# Patient Record
Sex: FEMALE | Race: WHITE | Employment: OTHER | URBAN - NONMETROPOLITAN AREA
[De-identification: names, ages, dates, MRNs, and addresses within clinical notes are randomized per-mention and may not be internally consistent; named-entity substitution may affect disease eponyms.]

---

## 2017-09-19 ENCOUNTER — OFFICE VISIT (OUTPATIENT)
Dept: FAMILY MEDICINE | Facility: OTHER | Age: 57
End: 2017-09-19
Attending: FAMILY MEDICINE
Payer: COMMERCIAL

## 2017-09-19 VITALS
WEIGHT: 190 LBS | BODY MASS INDEX: 34.96 KG/M2 | TEMPERATURE: 97.8 F | SYSTOLIC BLOOD PRESSURE: 122 MMHG | DIASTOLIC BLOOD PRESSURE: 82 MMHG | HEIGHT: 62 IN | OXYGEN SATURATION: 94 % | HEART RATE: 76 BPM

## 2017-09-19 DIAGNOSIS — L98.9 SKIN LESION: Primary | ICD-10-CM

## 2017-09-19 PROCEDURE — 99213 OFFICE O/P EST LOW 20 MIN: CPT | Performed by: PHYSICIAN ASSISTANT

## 2017-09-19 ASSESSMENT — ANXIETY QUESTIONNAIRES
7. FEELING AFRAID AS IF SOMETHING AWFUL MIGHT HAPPEN: NOT AT ALL
1. FEELING NERVOUS, ANXIOUS, OR ON EDGE: NOT AT ALL
GAD7 TOTAL SCORE: 0
3. WORRYING TOO MUCH ABOUT DIFFERENT THINGS: NOT AT ALL
5. BEING SO RESTLESS THAT IT IS HARD TO SIT STILL: NOT AT ALL
6. BECOMING EASILY ANNOYED OR IRRITABLE: NOT AT ALL
2. NOT BEING ABLE TO STOP OR CONTROL WORRYING: NOT AT ALL

## 2017-09-19 ASSESSMENT — PATIENT HEALTH QUESTIONNAIRE - PHQ9
SUM OF ALL RESPONSES TO PHQ QUESTIONS 1-9: 0
5. POOR APPETITE OR OVEREATING: NOT AT ALL

## 2017-09-19 ASSESSMENT — PAIN SCALES - GENERAL: PAINLEVEL: NO PAIN (0)

## 2017-09-19 NOTE — NURSING NOTE
"Chief Complaint   Patient presents with     Lesion Removal     lesion on right arm and left foot       Initial /82 (BP Location: Right arm, Patient Position: Chair, Cuff Size: Adult Large)  Pulse 76  Temp 97.8  F (36.6  C) (Tympanic)  Ht 5' 2\" (1.575 m)  Wt 190 lb (86.2 kg)  SpO2 94%  BMI 34.75 kg/m2 Estimated body mass index is 34.75 kg/(m^2) as calculated from the following:    Height as of this encounter: 5' 2\" (1.575 m).    Weight as of this encounter: 190 lb (86.2 kg).  Medication Reconciliation: complete   Lala Mckenzie      "

## 2017-09-19 NOTE — PROGRESS NOTES
Subjective:  Sarah Espitia is a 56 year old female who presents with a 1 year history of skin lesion on right upper arm. Next several year history of lesion on left foot. No personal history of skin cancer.        PMH, PSH, FH reviewed and unchanged    No current outpatient prescriptions on file.     No current facility-administered medications for this visit.        No Known Allergies    Review of Systems:  Gen: negative for fever, chills, change in weight  Derm: See HPI       Objective:    B/P: 122/82, T: 97.8, P: 76, R: Data Unavailable    Physical Exam:  Constitutional: healthy, alert and no distress  Skin:4 mm violaceous lesion on right upper arm. 3mm black irregular skin lesion on plantar aspect of left foot  Psych: Mood is euthymic with corresponding affect      Assessment and Plan  (L98.9) Skin lesion  (primary encounter diagnosis)  Comment: Refer to Dr. Lezama  Plan: DERMATOLOGY REFERRAL                 Follow up with worsening sx or failure to improve or with any questions or concerns.     Farida Novoa PA-C

## 2017-09-19 NOTE — MR AVS SNAPSHOT
After Visit Summary   9/19/2017    Sarah Espitia    MRN: [id number]           Patient Information     Date Of Birth          1960        Visit Information        Provider Department      9/19/2017 3:45 PM Farida Novoa PA [facility]        Today's Diagnoses     Skin lesion    -  1       Follow-ups after your visit        Additional Services     DERMATOLOGY REFERRAL       Your provider has referred you to: Dr. Lezama    Please be aware that coverage of these services is subject to the terms and limitations of your health insurance plan.  Call member services at your health plan with any benefit or coverage questions.      Please bring the following with you to your appointment:    (1) Any X-Rays, CTs or MRIs which have been performed.  Contact the facility where they were done to arrange for  prior to your scheduled appointment.    (2) List of current medications  (3) This referral request   (4) Any documents/labs given to you for this referral                  Your next 10 appointments already scheduled     Jan 16, 2018  1:30 PM CST   (Arrive by 1:15 PM)   New Visit with ELEUTERIO Lezama MD   [facility] ([facility] )    [address] [place] [id number] [phone]              Who to contact     If you have questions or need follow up information about today's clinic visit or your schedule please contact [facility] directly at [phone].  Normal or non-critical lab and imaging results will be communicated to you by MyChart, letter or phone within 4 business days after the clinic has received the results. If you do not hear from us within 7 days, please contact the clinic through MyChart or phone. If you have a critical or abnormal lab result, we will notify you by phone as soon as possible.  Submit refill requests through [employer] or call your pharmacy and they will forward the refill request to us.  "Please allow 3 business days for your refill to be completed.          Additional Information About Your Visit        MyChart Information     [employer] gives you secure access to your electronic health record. If you see a primary care provider, you can also send messages to your care team and make appointments. If you have questions, please call your primary care clinic.  If you do not have a primary care provider, please call [phone] and they will assist you.        Care EveryWhere ID     This is your Care EveryWhere ID. This could be used by other organizations to access your [place] medical records  [id number]        Your Vitals Were     Pulse Temperature Height Pulse Oximetry BMI (Body Mass Index)       76 97.8  F (36.6  C) (Tympanic) 5' 2\" (1.575 m) 94% 34.75 kg/m2        Blood Pressure from Last 3 Encounters:   09/19/17 122/82   07/15/16 105/84   01/12/15 125/70    Weight from Last 3 Encounters:   09/19/17 190 lb (86.2 kg)   07/15/16 180 lb (81.6 kg)   12/22/14 190 lb (86.2 kg)              We Performed the Following     DERMATOLOGY REFERRAL        Primary Care Provider Office Phone # Fax #    Mary Chapa [phone]       [facility] [place] [address]  [place] [address]        Equal Access to Services     KIKI MAC : Hadii aad ku hadasho Soomaali, waaxda luqadaha, qaybta kaalmada adeegyada, waxay desmond haynikki omalley . So [facility] [phone].    ATENCIÓN: Si habla español, tiene a bone disposición servicios gratuitos de asistencia lingüística. Llame al [phone].    We comply with applicable federal civil rights laws and [place] laws. We do not discriminate on the basis of race, color, national origin, age, disability sex, sexual orientation or gender identity.            Thank you!     Thank you for choosing [facility]  for your care. Our goal is always to provide you with excellent care. Hearing back from our patients is one way we can " continue to improve our services. Please take a few minutes to complete the written survey that you may receive in the mail after your visit with us. Thank you!             Your Updated Medication List - Protect others around you: Learn how to safely use, store and throw away your medicines at www.disposemymeds.org.      Notice  As of 9/19/2017  5:01 PM    You have not been prescribed any medications.

## 2017-09-20 ASSESSMENT — ANXIETY QUESTIONNAIRES: GAD7 TOTAL SCORE: 0

## 2017-11-26 ENCOUNTER — HEALTH MAINTENANCE LETTER (OUTPATIENT)
Age: 57
End: 2017-11-26

## 2018-01-15 ENCOUNTER — TELEPHONE (OUTPATIENT)
Dept: FAMILY MEDICINE | Facility: OTHER | Age: 58
End: 2018-01-15

## 2018-01-15 NOTE — TELEPHONE ENCOUNTER
I spoke with pt and explained that she would need to be seen for this as it has never been discussed.  Pt was transferred to scheduling to make an appt.

## 2018-01-15 NOTE — TELEPHONE ENCOUNTER
Reason for call:  REFERRAL   1. Concern:Weight loss  2. Have you seen a provider for this concern? No  3. Who? Dr Kai Lieberman  4. When? ASAP  5. What services are you requesting? Weight loss consult  Description: Pt wants referral to see him.  Was an appointment offered for this a call? No  If Yes:  Appointment type                Date    Preferred method for responding to this message: Telephone Call  What is your phone number ? [phone]    If we cannot reach you directly, may we leave a detailed response at the number you provided? Yes  Can this message wait until your PCP/Provider returns if not available today? No, Pt wants to have this done if possible before Farida returns as she has been seeing Farida and hasn't seen Dr Chapa for about 3 years.

## 2018-01-16 ENCOUNTER — OFFICE VISIT (OUTPATIENT)
Dept: DERMATOLOGY | Facility: OTHER | Age: 58
End: 2018-01-16
Attending: DERMATOLOGY
Payer: COMMERCIAL

## 2018-01-16 VITALS
HEIGHT: 62 IN | TEMPERATURE: 96.8 F | WEIGHT: 190 LBS | SYSTOLIC BLOOD PRESSURE: 140 MMHG | BODY MASS INDEX: 34.96 KG/M2 | HEART RATE: 60 BPM | DIASTOLIC BLOOD PRESSURE: 70 MMHG

## 2018-01-16 DIAGNOSIS — L98.9 SKIN LESION: ICD-10-CM

## 2018-01-16 DIAGNOSIS — C44.310 BCC (BASAL CELL CARCINOMA), FACE: Primary | ICD-10-CM

## 2018-01-16 PROCEDURE — 11100 HC BIOPSY SKIN/SUBQ/MUC MEM, SINGLE LESION: CPT | Performed by: DERMATOLOGY

## 2018-01-16 PROCEDURE — 99202 OFFICE O/P NEW SF 15 MIN: CPT | Mod: 25 | Performed by: DERMATOLOGY

## 2018-01-16 PROCEDURE — 88305 TISSUE EXAM BY PATHOLOGIST: CPT | Mod: TC | Performed by: DERMATOLOGY

## 2018-01-16 ASSESSMENT — PAIN SCALES - GENERAL: PAINLEVEL: NO PAIN (0)

## 2018-01-16 NOTE — NURSING NOTE
"Chief Complaint   Patient presents with     Consult     Skin Lesion, Farida Novoa referring.       Initial /70 (BP Location: Right arm, Patient Position: Chair, Cuff Size: Adult Regular)  Pulse 60  Temp 96.8  F (36  C) (Tympanic)  Ht 1.575 m (5' 2\")  Wt 86.2 kg (190 lb)  BMI 34.75 kg/m2 Estimated body mass index is 34.75 kg/(m^2) as calculated from the following:    Height as of this encounter: 1.575 m (5' 2\").    Weight as of this encounter: 86.2 kg (190 lb).  Medication Reconciliation: complete   RIKY SOLANO      "

## 2018-01-16 NOTE — MR AVS SNAPSHOT
After Visit Summary   1/16/2018    Sarah Espitia    MRN: [id number]           Patient Information     Date Of Birth          1960        Visit Information        Provider Department      1/16/2018 1:30 PM ELEUTERIO Lezama MD [facility]        Today's Diagnoses     BCC (basal cell carcinoma), face    -  1    Skin lesion           Follow-ups after your visit        Your next 10 appointments already scheduled     Jan 29, 2018  3:30 PM CST   (Arrive by 3:15 PM)   SHORT with MILDRED Brown   [facility] ([facility] )    [address]  [facility] [address]   [phone]              Who to contact     If you have questions or need follow up information about today's clinic visit or your schedule please contact [facility] directly at [phone].  Normal or non-critical lab and imaging results will be communicated to you by MyChart, letter or phone within 4 business days after the clinic has received the results. If you do not hear from us within 7 days, please contact the clinic through MyChart or phone. If you have a critical or abnormal lab result, we will notify you by phone as soon as possible.  Submit refill requests through [employer] or call your pharmacy and they will forward the refill request to us. Please allow 3 business days for your refill to be completed.          Additional Information About Your Visit        MyChart Information     [employer] gives you secure access to your electronic health record. If you see a primary care provider, you can also send messages to your care team and make appointments. If you have questions, please call your primary care clinic.  If you do not have a primary care provider, please call [phone] and they will assist you.        Care EveryWhere ID     This is your Care EveryWhere ID. This could be used by other organizations to access your [place] medical records  [id number]       "  Your Vitals Were     Pulse Temperature Height BMI (Body Mass Index)          60 96.8  F (36  C) (Tympanic) 1.575 m (5' 2\") 34.75 kg/m2         Blood Pressure from Last 3 Encounters:   01/16/18 140/70   09/19/17 122/82   07/15/16 105/84    Weight from Last 3 Encounters:   01/16/18 86.2 kg (190 lb)   09/19/17 86.2 kg (190 lb)   07/15/16 81.6 kg (180 lb)              We Performed the Following     BIOPSY SKIN/SUBQ/MUC MEM, SINGLE LESION     Surgical pathology exam        Primary Care Provider Office Phone # Fax #    Mary KIKA Chapa [phone]       [facility] [place] [address]  [place] [address]        Equal Access to Services     [facility] BUBBA : Hadii leslie chiu hadasho Soomaali, waaxda luqadaha, qaybta kaalmada adeegyada, alfonso omalley . So [facility] [phone].    ATENCIÓN: Si habla español, tiene a bone disposición servicios gratuitos de asistencia lingüística. Llame al [phone].    We comply with applicable federal civil rights laws and [place] laws. We do not discriminate on the basis of race, color, national origin, age, disability, sex, sexual orientation, or gender identity.            Thank you!     Thank you for choosing [facility]  for your care. Our goal is always to provide you with excellent care. Hearing back from our patients is one way we can continue to improve our services. Please take a few minutes to complete the written survey that you may receive in the mail after your visit with us. Thank you!             Your Updated Medication List - Protect others around you: Learn how to safely use, store and throw away your medicines at www.disposemymeds.org.      Notice  As of 1/16/2018 11:59 PM    You have not been prescribed any medications.      "

## 2018-01-16 NOTE — LETTER
1/16/2018       RE: Sarah Espitia  [address] [place] [address]     Dear Colleague,    Thank you for referring your patient, Sarah Espitia, to the [facility] [place] [facility]. Please see a copy of my visit note below.    dicated     Again, thank you for allowing me to participate in the care of your patient.      Sincerely,    ELEUTERIO Lezama MD

## 2018-01-19 ENCOUNTER — HOSPITAL PATHOLOGY (OUTPATIENT)
Dept: OTHER | Facility: CLINIC | Age: 58
End: 2018-01-19

## 2018-01-19 LAB — COPATH REPORT: NORMAL

## 2018-01-22 ENCOUNTER — TELEPHONE (OUTPATIENT)
Dept: DERMATOLOGY | Facility: OTHER | Age: 58
End: 2018-01-22

## 2018-01-22 LAB — COPATH REPORT: NORMAL

## 2018-01-29 ENCOUNTER — OFFICE VISIT (OUTPATIENT)
Dept: FAMILY MEDICINE | Facility: OTHER | Age: 58
End: 2018-01-29
Attending: PHYSICIAN ASSISTANT
Payer: COMMERCIAL

## 2018-01-29 VITALS
WEIGHT: 190 LBS | TEMPERATURE: 97.4 F | HEART RATE: 78 BPM | RESPIRATION RATE: 18 BRPM | OXYGEN SATURATION: 98 % | DIASTOLIC BLOOD PRESSURE: 64 MMHG | SYSTOLIC BLOOD PRESSURE: 100 MMHG | BODY MASS INDEX: 34.96 KG/M2 | HEIGHT: 62 IN

## 2018-01-29 DIAGNOSIS — E66.09 CLASS 1 OBESITY DUE TO EXCESS CALORIES WITH BODY MASS INDEX (BMI) OF 34.0 TO 34.9 IN ADULT, UNSPECIFIED WHETHER SERIOUS COMORBIDITY PRESENT: Primary | ICD-10-CM

## 2018-01-29 DIAGNOSIS — E66.811 CLASS 1 OBESITY DUE TO EXCESS CALORIES WITH BODY MASS INDEX (BMI) OF 34.0 TO 34.9 IN ADULT, UNSPECIFIED WHETHER SERIOUS COMORBIDITY PRESENT: Primary | ICD-10-CM

## 2018-01-29 PROCEDURE — 99213 OFFICE O/P EST LOW 20 MIN: CPT | Performed by: PHYSICIAN ASSISTANT

## 2018-01-29 ASSESSMENT — PATIENT HEALTH QUESTIONNAIRE - PHQ9
SUM OF ALL RESPONSES TO PHQ QUESTIONS 1-9: 2
SUM OF ALL RESPONSES TO PHQ QUESTIONS 1-9: 2
10. IF YOU CHECKED OFF ANY PROBLEMS, HOW DIFFICULT HAVE THESE PROBLEMS MADE IT FOR YOU TO DO YOUR WORK, TAKE CARE OF THINGS AT HOME, OR GET ALONG WITH OTHER PEOPLE: NOT DIFFICULT AT ALL

## 2018-01-29 ASSESSMENT — PAIN SCALES - GENERAL: PAINLEVEL: NO PAIN (0)

## 2018-01-29 ASSESSMENT — ANXIETY QUESTIONNAIRES
6. BECOMING EASILY ANNOYED OR IRRITABLE: NOT AT ALL
4. TROUBLE RELAXING: NOT AT ALL
GAD7 TOTAL SCORE: 0
2. NOT BEING ABLE TO STOP OR CONTROL WORRYING: NOT AT ALL
GAD7 TOTAL SCORE: 0
3. WORRYING TOO MUCH ABOUT DIFFERENT THINGS: NOT AT ALL
7. FEELING AFRAID AS IF SOMETHING AWFUL MIGHT HAPPEN: NOT AT ALL
1. FEELING NERVOUS, ANXIOUS, OR ON EDGE: NOT AT ALL
7. FEELING AFRAID AS IF SOMETHING AWFUL MIGHT HAPPEN: NOT AT ALL
5. BEING SO RESTLESS THAT IT IS HARD TO SIT STILL: NOT AT ALL
GAD7 TOTAL SCORE: 0

## 2018-01-29 NOTE — NURSING NOTE
"Chief Complaint   Patient presents with     Referral       Initial /64 (BP Location: Left arm, Patient Position: Sitting, Cuff Size: Adult Large)  Pulse 78  Temp 97.4  F (36.3  C) (Tympanic)  Resp 18  Ht 5' 2\" (1.575 m)  Wt 190 lb (86.2 kg)  SpO2 98%  BMI 34.75 kg/m2 Estimated body mass index is 34.75 kg/(m^2) as calculated from the following:    Height as of this encounter: 5' 2\" (1.575 m).    Weight as of this encounter: 190 lb (86.2 kg).  Medication Reconciliation: complete    "

## 2018-01-29 NOTE — PROGRESS NOTES
Subjective:  Sarah Espitia is a 57 year old female  who presents for help with weight loss. She has had a lifelong struggle with her weight. Otherwise no health issues. No medications.    Active diagnoses this visit:  Data Unavailable    Past Medical History:   Diagnosis Date     Actinic keratosis 2013     Dyslipidemia 2013     Eczema 2011     Hyperplastic colon polyp 2013       Past Surgical History:   Procedure Laterality Date     Bilateral tubal ligation        SECTION  1995    pregnancy      SECTION  2000    pregnancy (provider: Helena Serna)      SECTION  1999    pregnancy       SECTION  1996    pregnancy     COLONOSCOPY N/A 2015    Procedure: COLONOSCOPY;  Surgeon: Mariola Olsen MD;  Location: [facility]     colonoscopy with polypectomy  3/30/2009    outcome: hemorrhoids, repeat 5 years (provider: Akin Hedrick)     meniscus repair  2012    left     ORTHOPEDIC SURGERY  10/2016    right meniscus repair       Family History   Problem Relation Age of Onset     Family History Negative Brother      Family History Negative Father      C.A.D. Mother      CEREBROVASCULAR DISEASE Mother 63     CVA     Lipids Mother      hyperlipidemia     Hypertension Mother      DIABETES Maternal Grandmother      CANCER Sister 30     leukemia - in remission, had chemo     Family History Negative Brother      Family History Negative Brother      Family History Negative Brother      CANCER Paternal Grandfather      lung but not a smoker     Cardiovascular Maternal Grandfather 60     AMI       No current outpatient prescriptions on file.     No current facility-administered medications for this visit.        No Known Allergies    Review of Systems:  Gen: negative for fever, change in weight  Resp: negative for cough, SOB or wheeze  CV: negative for chest pain, palpitations   GI/: negative for abdominal pain, nausea, vomiting, diarrhea, constipation  Psych:  negative for changes in mood or affect    Objective:    B/P: 100/64, T: 97.4, P: 78, R: 18    Physical Exam:  Constitutional: healthy, alert and no acute distress  CV: RRR. No murmur  Pulm: Lungs clear to auscultation without wheeze, rales or rhonchi  Psych: mentation and affect appear normal      Assessment/Plan    (E66.09,  Z68.34) Class 1 obesity due to excess calories with body mass index (BMI) of 34.0 to 34.9 in adult, unspecified whether serious comorbidity present  (primary encounter diagnosis)  Comment: Refer to Dr. Kai Lieberman, DR Jackson  Plan: BARIATRIC ADULT REFERRAL                      Farida Novoa, PAC

## 2018-01-29 NOTE — MR AVS SNAPSHOT
After Visit Summary   1/29/2018    Sarah Espitia    MRN: [id number]           Patient Information     Date Of Birth          1960        Visit Information        Provider Department      1/29/2018 3:30 PM Farida Novoa PA [facility]        Today's Diagnoses     Class 1 obesity due to excess calories with body mass index (BMI) of 34.0 to 34.9 in adult, unspecified whether serious comorbidity present    -  1       Follow-ups after your visit        Additional Services     BARIATRIC ADULT REFERRAL       Your provider has referred you to: Dr. Kai Lieberman. [facility]    Please be aware that coverage of these services is subject to the terms and limitations of your health insurance plan.  Call member services at your health plan with any benefit or coverage questions.      Please bring the following to your appointment:    >>   List of current medications   >>   This referral request   >>   Any documents/labs given to you for this referral                  Who to contact     If you have questions or need follow up information about today's clinic visit or your schedule please contact [facility] directly at [phone].  Normal or non-critical lab and imaging results will be communicated to you by [employer], letter or phone within 4 business days after the clinic has received the results. If you do not hear from us within 7 days, please contact the clinic through [employer] or phone. If you have a critical or abnormal lab result, we will notify you by phone as soon as possible.  Submit refill requests through [employer] or call your pharmacy and they will forward the refill request to us. Please allow 3 business days for your refill to be completed.          Additional Information About Your Visit        MyChart Information     [employer] gives you secure access to your electronic health record. If you see a primary care provider, you can also send messages  "to your care team and make appointments. If you have questions, please call your primary care clinic.  If you do not have a primary care provider, please call [phone] and they will assist you.        Care EveryWhere ID     This is your Care EveryWhere ID. This could be used by other organizations to access your [place] medical records  [id number]        Your Vitals Were     Pulse Temperature Respirations Height Pulse Oximetry BMI (Body Mass Index)    78 97.4  F (36.3  C) (Tympanic) 18 5' 2\" (1.575 m) 98% 34.75 kg/m2       Blood Pressure from Last 3 Encounters:   01/29/18 100/64   01/16/18 140/70   09/19/17 122/82    Weight from Last 3 Encounters:   01/29/18 190 lb (86.2 kg)   01/16/18 190 lb (86.2 kg)   09/19/17 190 lb (86.2 kg)              We Performed the Following     BARIATRIC ADULT REFERRAL        Primary Care Provider Office Phone # Fax #    Mary Chapa [phone]       [facility] [place] [address]  [place] [address]        Equal Access to Services     [facility] AH: Hadii aad ku hadasho Soomaali, waaxda luqadaha, qaybta kaalmada adeegyada, waxay idiin hayaan adeeg kharavivek la'dcn ah. So [facility] [phone].    ATENCIÓN: Si habla español, tiene a bone disposición servicios gratuitos de asistencia lingüística. Llame al [phone].    We comply with applicable federal civil rights laws and [place] laws. We do not discriminate on the basis of race, color, national origin, age, disability, sex, sexual orientation, or gender identity.            Thank you!     Thank you for choosing [facility]  for your care. Our goal is always to provide you with excellent care. Hearing back from our patients is one way we can continue to improve our services. Please take a few minutes to complete the written survey that you may receive in the mail after your visit with us. Thank you!             Your Updated Medication List - Protect others around you: Learn how to safely " use, store and throw away your medicines at www.disposemymeds.org.      Notice  As of 1/29/2018  5:13 PM    You have not been prescribed any medications.

## 2018-01-30 ASSESSMENT — ANXIETY QUESTIONNAIRES: GAD7 TOTAL SCORE: 0

## 2018-01-30 ASSESSMENT — PATIENT HEALTH QUESTIONNAIRE - PHQ9: SUM OF ALL RESPONSES TO PHQ QUESTIONS 1-9: 2

## 2018-02-01 ENCOUNTER — TRANSFERRED RECORDS (OUTPATIENT)
Dept: HEALTH INFORMATION MANAGEMENT | Facility: HOSPITAL | Age: 58
End: 2018-02-01

## 2018-11-27 ENCOUNTER — HEALTH MAINTENANCE LETTER (OUTPATIENT)
Age: 58
End: 2018-11-27

## 2019-07-15 ENCOUNTER — OFFICE VISIT (OUTPATIENT)
Dept: FAMILY MEDICINE | Facility: OTHER | Age: 59
End: 2019-07-15
Attending: NURSE PRACTITIONER
Payer: COMMERCIAL

## 2019-07-15 ENCOUNTER — NURSE TRIAGE (OUTPATIENT)
Dept: FAMILY MEDICINE | Facility: OTHER | Age: 59
End: 2019-07-15

## 2019-07-15 VITALS
TEMPERATURE: 98.7 F | OXYGEN SATURATION: 97 % | SYSTOLIC BLOOD PRESSURE: 116 MMHG | HEART RATE: 80 BPM | WEIGHT: 167.3 LBS | BODY MASS INDEX: 31.59 KG/M2 | DIASTOLIC BLOOD PRESSURE: 72 MMHG | HEIGHT: 61 IN
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
## 2019-07-15 DIAGNOSIS — L03.119 CELLULITIS AND ABSCESS OF LEG: ICD-10-CM

## 2019-07-15 DIAGNOSIS — S80.862A INSECT BITE OF LEFT LOWER LEG, INITIAL ENCOUNTER: Primary | ICD-10-CM

## 2019-07-15 DIAGNOSIS — L02.419 CELLULITIS AND ABSCESS OF LEG: ICD-10-CM

## 2019-07-15 PROCEDURE — 99213 OFFICE O/P EST LOW 20 MIN: CPT | Performed by: NURSE PRACTITIONER

## 2019-07-15 RX ORDER — DOXYCYCLINE 100 MG/1
100 CAPSULE ORAL 2 TIMES DAILY
Qty: 42 CAPSULE | Refills: 0 | Status: SHIPPED | OUTPATIENT
Start: 2019-07-15 | End: 2019-08-05

## 2019-07-15 ASSESSMENT — PAIN SCALES - GENERAL: PAINLEVEL: NO PAIN (0)

## 2019-07-15 ASSESSMENT — MIFFLIN-ST. JEOR: SCORE: 1266.25

## 2019-07-15 NOTE — NURSING NOTE
"Chief Complaint   Patient presents with     Insect Bites       Initial /72 (BP Location: Left arm, Patient Position: Sitting, Cuff Size: Adult Regular)   Pulse 80   Temp 98.7  F (37.1  C) (Tympanic)   Ht 1.549 m (5' 1\")   Wt 75.9 kg (167 lb 4.8 oz)   SpO2 97%   BMI 31.61 kg/m   Estimated body mass index is 31.61 kg/m  as calculated from the following:    Height as of this encounter: 1.549 m (5' 1\").    Weight as of this encounter: 75.9 kg (167 lb 4.8 oz).  Medication Reconciliation: complete  "

## 2019-07-15 NOTE — PROGRESS NOTES
Sarah Espitia is a 58 year old female who presents to clinic today for the following health issues:      Concern - Insect bite    Onset: about a week or two- Bite on back of right knee.  Unsure of whether or not it was an insect or a tick    Description:     Redness and swelling, tenderness, joint pain, headache, chills, dizziness    Intensity: mild, moderate    Progression of Symptoms:  worsening    Accompanying Signs & Symptoms:  body aches, shivering, sweating, headaches, weakness, fatigue, sore throat.    Previous history of similar problem:   N/A    Precipitating factors:   Worsened by: N/A    Alleviating factors:  Improved by: N/A  Therapies Tried and outcome: Ibuprofen        Patient Active Problem List   Diagnosis     Advanced care planning/counseling discussion     Hyperlipidemia with target LDL less than 130     Hypovitaminosis D     History of colonic polyps     ACP (advance care planning)     Past Surgical History:   Procedure Laterality Date     Bilateral tubal ligation        SECTION  1995    pregnancy      SECTION  2000    pregnancy (provider: Helena Serna)      SECTION  1999    pregnancy       SECTION  1996    pregnancy     COLONOSCOPY N/A 2015    Procedure: COLONOSCOPY;  Surgeon: Mariola Olsen MD;  Location: [facility]     colonoscopy with polypectomy  3/30/2009    outcome: hemorrhoids, repeat 5 years (provider: Akin Hedrick)     meniscus repair  2012    left     ORTHOPEDIC SURGERY  10/2016    right meniscus repair       Social History     Tobacco Use     Smoking status: Former Smoker     Packs/day: 1.50     Years: 16.00     Pack years: 24.00     Types: Cigarettes     Smokeless tobacco: Never Used     Tobacco comment: quit in    Substance Use Topics     Alcohol use: Yes     Comment: 1/month     Family History   Problem Relation Age of Onset     Family History Negative Brother      Family History Negative Father      C.A.D. Mother       "Cerebrovascular Disease Mother 63        CVA     Lipids Mother         hyperlipidemia     Hypertension Mother      Diabetes Maternal Grandmother      Cancer Sister 30        leukemia - in remission, had chemo     Family History Negative Brother      Family History Negative Brother      Family History Negative Brother      Cancer Paternal Grandfather         lung but not a smoker     Cardiovascular Maternal Grandfather 60        AMI         Current Outpatient Medications   Medication Sig Dispense Refill     doxycycline hyclate (VIBRAMYCIN) 100 MG capsule Take 1 capsule (100 mg) by mouth 2 times daily for 21 days 42 capsule 0     No Known Allergies  Recent Labs   Lab Test 08/25/14  0748   *   HDL 62   TRIG 173*   ALT 24   CR 0.75   GFRESTIMATED 80   GFRESTBLACK >90   GFR Calc     POTASSIUM 3.9   TSH 2.46      BP Readings from Last 3 Encounters:   07/15/19 116/72   01/29/18 100/64   01/16/18 140/70    Wt Readings from Last 3 Encounters:   07/15/19 75.9 kg (167 lb 4.8 oz)   01/29/18 86.2 kg (190 lb)   01/16/18 86.2 kg (190 lb)              Reviewed and updated as needed this visit by Provider         Review of Systems   ROS COMP: Constitutional, HEENT, cardiovascular, pulmonary, gi and gu systems are negative, except as otherwise noted.      Objective    /72 (BP Location: Left arm, Patient Position: Sitting, Cuff Size: Adult Regular)   Pulse 80   Temp 98.7  F (37.1  C) (Tympanic)   Ht 1.549 m (5' 1\")   Wt 75.9 kg (167 lb 4.8 oz)   SpO2 97%   BMI 31.61 kg/m    Body mass index is 31.61 kg/m .       Physical Exam   GENERAL: healthy, alert and no distress  NECK: no adenopathy, no asymmetry, masses, or scars and thyroid normal to palpation  RESP: lungs clear to auscultation - no rales, rhonchi or wheezes  CV: regular rate and rhythm, normal S1 S2, no S3 or S4, no murmur, click or rub, no peripheral edema and peripheral pulses strong  SKIN: Right popliteal rash - extending medial to lateral, " erythematous, with a central area which looks like a bite. Surrounding erythema - to 4 cm.             Assessment & Plan     1. Insect bite of left lower leg, initial encounter  2. Cellulitis and abscess of leg  - doxycycline hyclate (VIBRAMYCIN) 100 MG capsule; Take 1 capsule (100 mg) by mouth 2 times daily for 21 days  Dispense: 42 capsule; Refill: 0    Monitor for fever  Keep area clean and dry  Topical antibiotic as needed  Derm marker to monitor for spread of infection  Ibuprofen as needed    I  recommend a lyme titer in 3 months    If this does not improve please contact your primary care provider, or go to     Diflucan Rx written in the event of a yeast infection        Return if symptoms worsen or fail to improve.      Zulema Snow NP  [facility]

## 2019-07-15 NOTE — PATIENT INSTRUCTIONS
Assessment & Plan     1. Insect bite of left lower leg, initial encounter  2. Cellulitis and abscess of leg  - doxycycline hyclate (VIBRAMYCIN) 100 MG capsule; Take 1 capsule (100 mg) by mouth 2 times daily for 21 days  Dispense: 42 capsule; Refill: 0    Monitor for fever  Keep area clean and dry  Topical antibiotic as needed  Derm marker to monitor for spread of infection  Ibuprofen as needed    I  recommend a lyme titer in 3 months    If this does not improve please contact your primary care provider, or go to     Diflucan Rx written in the event of a yeast infection        Return if symptoms worsen or fail to improve.      Zulema Snow NP  [facility]

## 2020-03-02 ENCOUNTER — HEALTH MAINTENANCE LETTER (OUTPATIENT)
Age: 60
End: 2020-03-02

## 2020-03-12 ENCOUNTER — ANCILLARY PROCEDURE (OUTPATIENT)
Dept: GENERAL RADIOLOGY | Facility: OTHER | Age: 60
End: 2020-03-12
Attending: FAMILY MEDICINE
Payer: COMMERCIAL

## 2020-03-12 ENCOUNTER — OFFICE VISIT (OUTPATIENT)
Dept: ORTHOPEDICS | Facility: OTHER | Age: 60
End: 2020-03-12
Attending: ORTHOPAEDIC SURGERY
Payer: COMMERCIAL

## 2020-03-12 ENCOUNTER — OFFICE VISIT (OUTPATIENT)
Dept: FAMILY MEDICINE | Facility: OTHER | Age: 60
End: 2020-03-12
Attending: FAMILY MEDICINE
Payer: COMMERCIAL

## 2020-03-12 VITALS
DIASTOLIC BLOOD PRESSURE: 82 MMHG | OXYGEN SATURATION: 97 % | SYSTOLIC BLOOD PRESSURE: 126 MMHG | HEART RATE: 64 BPM | HEIGHT: 61 IN | TEMPERATURE: 97.3 F | WEIGHT: 179 LBS | BODY MASS INDEX: 33.79 KG/M2

## 2020-03-12 VITALS
HEART RATE: 77 BPM | DIASTOLIC BLOOD PRESSURE: 80 MMHG | OXYGEN SATURATION: 98 % | SYSTOLIC BLOOD PRESSURE: 120 MMHG | TEMPERATURE: 98.6 F

## 2020-03-12 DIAGNOSIS — S52.571A OTHER CLOSED INTRA-ARTICULAR FRACTURE OF DISTAL END OF RIGHT RADIUS, INITIAL ENCOUNTER: ICD-10-CM

## 2020-03-12 DIAGNOSIS — M25.531 RIGHT WRIST PAIN: ICD-10-CM

## 2020-03-12 DIAGNOSIS — M25.531 RIGHT WRIST PAIN: Primary | ICD-10-CM

## 2020-03-12 DIAGNOSIS — M79.641 PAIN OF RIGHT HAND: ICD-10-CM

## 2020-03-12 DIAGNOSIS — S52.501A CLOSED FRACTURE OF DISTAL END OF RIGHT RADIUS, UNSPECIFIED FRACTURE MORPHOLOGY, INITIAL ENCOUNTER: Primary | ICD-10-CM

## 2020-03-12 PROCEDURE — 25600 CLTX DST RDL FX/EPHYS SEP WO: CPT | Mod: RT | Performed by: ORTHOPAEDIC SURGERY

## 2020-03-12 PROCEDURE — 73110 X-RAY EXAM OF WRIST: CPT | Mod: TC

## 2020-03-12 PROCEDURE — 99213 OFFICE O/P EST LOW 20 MIN: CPT | Performed by: FAMILY MEDICINE

## 2020-03-12 PROCEDURE — 73130 X-RAY EXAM OF HAND: CPT | Mod: TC

## 2020-03-12 ASSESSMENT — PAIN SCALES - GENERAL
PAINLEVEL: NO PAIN (0)
PAINLEVEL: MILD PAIN (3)

## 2020-03-12 ASSESSMENT — MIFFLIN-ST. JEOR: SCORE: 1314.32
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
## 2020-03-12 NOTE — PROGRESS NOTES
SUBJECTIVE:   CC: Sarah Espitia is an 59 year old woman who presents for preventive health visit.     Healthy Habits:    Do you get at least three servings of calcium containing foods daily (dairy, green leafy vegetables, etc.)? No    Amount of exercise or daily activities, outside of work: 7 day(s) per week    Problems taking medications regularly not applicable    Medication side effects: No    Have you had an eye exam in the past two years? yes    Do you see a dentist twice per year? yes    Do you have sleep apnea, excessive snoring or daytime drowsiness?no          Today's PHQ-2 Score: No flowsheet data found.    Abuse: Current or Past(Physical, Sexual or Emotional)- No  Do you feel safe in your environment? Yes        Social History     Tobacco Use     Smoking status: Former Smoker     Packs/day: 1.50     Years: 16.00     Pack years: 24.00     Types: Cigarettes     Smokeless tobacco: Never Used     Tobacco comment: quit in    Substance Use Topics     Alcohol use: Yes     Comment: 1/month     If you drink alcohol do you typically have >3 drinks per day or >7 drinks per week? No                     Reviewed orders with patient.  Reviewed health maintenance and updated orders accordingly - Yes  Lab work is in process  Labs reviewed in EPIC  BP Readings from Last 3 Encounters:   20 118/70   20 122/74   20 110/75    Wt Readings from Last 3 Encounters:   20 83 kg (183 lb)   20 82.6 kg (182 lb)   20 81.2 kg (179 lb)                  Patient Active Problem List   Diagnosis     Advanced care planning/counseling discussion     Hyperlipidemia with target LDL less than 130     Hypovitaminosis D     History of colonic polyps     ACP (advance care planning)     Other closed intra-articular fracture of distal end of right radius, initial encounter     Past Surgical History:   Procedure Laterality Date     Bilateral tubal ligation        SECTION  1995    pregnancy       SECTION  2000    pregnancy (provider: Helena Serna)      SECTION  1999    pregnancy       SECTION  1996    pregnancy     COLONOSCOPY N/A 2015    Procedure: COLONOSCOPY;  Surgeon: Mariola Olsen MD;  Location: [facility]     colonoscopy with polypectomy  3/30/2009    outcome: hemorrhoids, repeat 5 years (provider: Akin Hedrick)     meniscus repair  2012    left     ORTHOPEDIC SURGERY  10/2016    right meniscus repair       Social History     Tobacco Use     Smoking status: Former Smoker     Packs/day: 1.50     Years: 16.00     Pack years: 24.00     Types: Cigarettes     Smokeless tobacco: Never Used     Tobacco comment: quit in    Substance Use Topics     Alcohol use: Yes     Frequency: 2-4 times a month     Drinks per session: 1 or 2     Comment: 1/month     Family History   Problem Relation Age of Onset     Family History Negative Brother      Pancreatitis Father 91        blocked pancreatic duct     C.A.D. Mother      Cerebrovascular Disease Mother 63        CVA     Lipids Mother         hyperlipidemia     Hypertension Mother      Diabetes Maternal Grandmother      Kidney Disease Maternal Grandmother      Obesity Maternal Grandmother      Cancer Sister 30        leukemia - in remission, had chemo     Family History Negative Brother      Family History Negative Brother      Family History Negative Brother      Lung Cancer Paternal Grandfather         neg tobacco     Cardiovascular Maternal Grandfather 60        AMI     Dementia Paternal Grandmother          No current outpatient medications on file.     No Known Allergies    Mammogram Screening: Patient over age 50, mutual decision to screen reflected in health maintenance.    Pertinent mammograms are reviewed under the imaging tab.  History of abnormal Pap smear: NO - age 30-65 PAP every 5 years with negative HPV co-testing recommended  PAP / HPV 2014   PAP NIL     Reviewed and updated as needed this visit by  clinical staff  Tobacco  Allergies  Meds  Med Hx  Surg Hx  Fam Hx  Soc Hx        Reviewed and updated as needed this visit by Provider        Past Medical History:   Diagnosis Date     Actinic keratosis 2013     Dyslipidemia 2013     Eczema 2011     GRISELDA (generalized anxiety disorder)      Hyperplastic colon polyp 2013      Past Surgical History:   Procedure Laterality Date     Bilateral tubal ligation        SECTION  1995    pregnancy      SECTION  2000    pregnancy (provider: Helena Serna)      SECTION  1999    pregnancy       SECTION  1996    pregnancy     COLONOSCOPY N/A 2015    Procedure: COLONOSCOPY;  Surgeon: Mariola Olsen MD;  Location: [facility]     colonoscopy with polypectomy  3/30/2009    outcome: hemorrhoids, repeat 5 years (provider: Akin Hedrick)     meniscus repair  2012    left     ORTHOPEDIC SURGERY  10/2016    right meniscus repair     OB History    Para Term  AB Living   4 4 4 0 0 4   SAB TAB Ectopic Multiple Live Births   0 0 0 0 0      # Outcome Date GA Lbr Ben/2nd Weight Sex Delivery Anes PTL Lv   4 Term      -SEC      3 Term      -SEC      2 Term      -SEC      1 Term      -SEC         Obstetric Comments   Breast fed       ROS:  CONSTITUTIONAL: NEGATIVE for fever, chills, change in weight  INTEGUMENTARY/SKIN: NEGATIVE for worrisome rashes, moles or lesions  EYES: NEGATIVE for vision changes or irritation  ENT: NEGATIVE for ear, mouth and throat problems  RESP: NEGATIVE for significant cough or SOB  BREAST: NEGATIVE for masses, tenderness or discharge  CV: NEGATIVE for chest pain, palpitations or peripheral edema  GI: NEGATIVE for nausea, abdominal pain, heartburn, or change in bowel habits  : NEGATIVE for unusual urinary or vaginal symptoms. No vaginal bleeding.  MUSCULOSKELETAL: NEGATIVE for significant arthralgias or myalgia  NEURO: NEGATIVE for weakness,  "dizziness or paresthesias  PSYCHIATRIC: NEGATIVE for changes in mood or affect     OBJECTIVE:   /70 (BP Location: Right arm, Patient Position: Chair, Cuff Size: Adult Regular)   Pulse 62   Temp 97.5  F (36.4  C) (Tympanic)   Resp 20   Ht 1.537 m (5' 0.5\")   Wt 83 kg (183 lb)   SpO2 97%   BMI 35.15 kg/m    EXAM:  GENERAL: healthy, alert and no distress  EYES: Eyes grossly normal to inspection, PERRL and conjunctivae and sclerae normal  HENT: ear canals and TM's normal, nose and mouth without ulcers or lesions  NECK: no adenopathy, no asymmetry, masses, or scars and thyroid normal to palpation  RESP: lungs clear to auscultation - no rales, rhonchi or wheezes  BREAST: normal without masses, tenderness or nipple discharge and no palpable axillary masses or adenopathy  CV: regular rate and rhythm, normal S1 S2, no S3 or S4, no murmur, click or rub, no peripheral edema and peripheral pulses strong  ABDOMEN: soft, nontender, no hepatosplenomegaly, no masses and bowel sounds normal   (female): normal female external genitalia, normal urethral meatus, vaginal mucosa pink, moist, well rugated, and normal cervix/adnexa/uterus without masses or discharge, pap done  MS: no gross musculoskeletal defects noted, no edema  SKIN: no suspicious lesions or rashes  NEURO: Normal strength and tone, mentation intact and speech normal  PSYCH: mentation appears normal, affect normal/bright    Diagnostic Test Results:  Labs reviewed in Epic  Results for orders placed or performed in visit on 07/31/20   Lipid Profile (Chol, Trig, HDL, LDL calc)     Status: Abnormal   Result Value Ref Range    Cholesterol 273 (H) <200 mg/dL    Triglycerides 189 (H) <150 mg/dL    HDL Cholesterol 62 >49 mg/dL    LDL Cholesterol Calculated 173 (H) <100 mg/dL    Non HDL Cholesterol 211 (H) <130 mg/dL   Comprehensive metabolic panel     Status: None   Result Value Ref Range    Sodium 140 133 - 144 mmol/L    Potassium 3.7 3.4 - 5.3 mmol/L    Chloride " 108 94 - 109 mmol/L    Carbon Dioxide 28 20 - 32 mmol/L    Anion Gap 4 3 - 14 mmol/L    Glucose 90 70 - 99 mg/dL    Urea Nitrogen 15 7 - 30 mg/dL    Creatinine 0.71 0.52 - 1.04 mg/dL    GFR Estimate >90 >60 mL/min/[1.73_m2]    GFR Estimate If Black >90 >60 mL/min/[1.73_m2]    Calcium 8.8 8.5 - 10.1 mg/dL    Bilirubin Total 0.5 0.2 - 1.3 mg/dL    Albumin 3.7 3.4 - 5.0 g/dL    Protein Total 7.4 6.8 - 8.8 g/dL    Alkaline Phosphatase 78 40 - 150 U/L    ALT 23 0 - 50 U/L    AST 18 0 - 45 U/L       ASSESSMENT/PLAN:   (Z12.11) Encounter for screening colonoscopy  (primary encounter diagnosis)  Comment:   Plan: GENERAL SURG ADULT REFERRAL        Would also like 2nd opinion on right axilla lipoma    (Z00.00) Routine general medical examination at a health care facility  Comment:   Plan: A pap thin layer screen with  HPV - recommended        age 30 - 65 years (select HPV order below), HPV        High Risk Types DNA Cervical        Follow-up 1 year    (Z11.59) Encounter for screening for other viral diseases  Comment:   Plan: Hepatitis C antibody, HIV Antigen Antibody         Combo, CANCELED: Hepatitis C antibody,         CANCELED: HIV Antigen Antibody Combo          (Z12.31) Other screening mammogram  Comment:   Plan: MA SCREENING DIGITAL BILATERAL ([place])          (E78.5) Hyperlipidemia with target LDL less than 130  Comment:   Plan: Comprehensive metabolic panel, Lipid Profile         (Chol, Trig, HDL, LDL calc), CANCELED: Lipid         Profile (Chol, Trig, HDL, LDL calc)          (Z23) Need for vaccination  Comment:   Plan: TDAP, IM (10 - 64 YRS) - Adacel, ADMIN 1st         VACCINE          (R73.09) Elevated glucose  Comment:   Plan: Hemoglobin A1c, TSH with free T4 reflex          (E53.8) Vitamin B12 deficiency (non anemic)  Comment:   Plan: Vitamin B12          (E55.9) Hypovitaminosis D  Comment:   Plan: Vitamin D Deficiency            COUNSELING:   Reviewed preventive health counseling, as reflected in patient  "instructions  Special attention given to:        Regular exercise       Healthy diet/nutrition       Vision screening       Hearing screening       Colon cancer screening       Consider Hep C screening for patients born between 1945 and 1965       HIV screeninx in teen years, 1x in adult years, and at intervals if high risk       (Nancy)menopause management    Estimated body mass index is 35.15 kg/m  as calculated from the following:    Height as of this encounter: 1.537 m (5' 0.5\").    Weight as of this encounter: 83 kg (183 lb).    Weight management plan: Discussed healthy diet and exercise guidelines     reports that she has quit smoking. Her smoking use included cigarettes. She has a 24.00 pack-year smoking history. She has never used smokeless tobacco.      Counseling Resources:  ATP IV Guidelines  Pooled Cohorts Equation Calculator  Breast Cancer Risk Calculator  FRAX Risk Assessment  ICSI Preventive Guidelines  Dietary Guidelines for Americans, 2010  USDA's MyPlate  ASA Prophylaxis  Lung CA Screening    Mary Chapa MD  [facility] - [place]  "
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
## 2020-03-12 NOTE — NURSING NOTE
"Chief Complaint   Patient presents with     Musculoskeletal Problem       Initial /80   Pulse 77   Temp 98.6  F (37  C)   SpO2 98%  Estimated body mass index is 33.82 kg/m  as calculated from the following:    Height as of an earlier encounter on 3/12/20: 1.549 m (5' 1\").    Weight as of an earlier encounter on 3/12/20: 81.2 kg (179 lb).  Medication Reconciliation: complete  Jessa Behrman, LPN  "

## 2020-03-12 NOTE — NURSING NOTE
"Chief Complaint   Patient presents with     possible broken wrist       Initial /82   Pulse 64   Temp 97.3  F (36.3  C)   Ht 1.549 m (5' 1\")   Wt 81.2 kg (179 lb)   SpO2 97%   BMI 33.82 kg/m   Estimated body mass index is 33.82 kg/m  as calculated from the following:    Height as of this encounter: 1.549 m (5' 1\").    Weight as of this encounter: 81.2 kg (179 lb).  Medication Reconciliation: complete  Mariel Gill LPN    "

## 2020-03-17 DIAGNOSIS — S52.501A CLOSED FRACTURE OF DISTAL END OF RIGHT RADIUS, UNSPECIFIED FRACTURE MORPHOLOGY, INITIAL ENCOUNTER: Primary | ICD-10-CM

## 2020-03-19 ENCOUNTER — OFFICE VISIT (OUTPATIENT)
Dept: ORTHOPEDICS | Facility: OTHER | Age: 60
End: 2020-03-19
Attending: ORTHOPAEDIC SURGERY
Payer: COMMERCIAL

## 2020-03-19 ENCOUNTER — ANCILLARY PROCEDURE (OUTPATIENT)
Dept: GENERAL RADIOLOGY | Facility: OTHER | Age: 60
End: 2020-03-19
Attending: PHYSICIAN ASSISTANT
Payer: COMMERCIAL

## 2020-03-19 VITALS
WEIGHT: 179 LBS | OXYGEN SATURATION: 96 % | DIASTOLIC BLOOD PRESSURE: 70 MMHG | HEART RATE: 76 BPM | SYSTOLIC BLOOD PRESSURE: 110 MMHG | BODY MASS INDEX: 33.79 KG/M2 | TEMPERATURE: 97.8 F | HEIGHT: 61 IN

## 2020-03-19 DIAGNOSIS — S52.501A CLOSED FRACTURE OF DISTAL END OF RIGHT RADIUS, UNSPECIFIED FRACTURE MORPHOLOGY, INITIAL ENCOUNTER: Primary | ICD-10-CM

## 2020-03-19 DIAGNOSIS — S52.501A CLOSED FRACTURE OF DISTAL END OF RIGHT RADIUS, UNSPECIFIED FRACTURE MORPHOLOGY, INITIAL ENCOUNTER: ICD-10-CM

## 2020-03-19 PROCEDURE — 99207 ZZC FRACTURE CARE IN GLOBAL PERIOD: CPT | Performed by: ORTHOPAEDIC SURGERY

## 2020-03-19 PROCEDURE — 73130 X-RAY EXAM OF HAND: CPT | Mod: TC

## 2020-03-19 ASSESSMENT — MIFFLIN-ST. JEOR: SCORE: 1314.32

## 2020-03-19 ASSESSMENT — PAIN SCALES - GENERAL: PAINLEVEL: MILD PAIN (2)

## 2020-03-19 NOTE — NURSING NOTE
"Chief Complaint   Patient presents with     Musculoskeletal Problem       Initial /70   Pulse 76   Temp 97.8  F (36.6  C)   Ht 1.549 m (5' 1\")   Wt 81.2 kg (179 lb)   SpO2 96%   BMI 33.82 kg/m   Estimated body mass index is 33.82 kg/m  as calculated from the following:    Height as of this encounter: 1.549 m (5' 1\").    Weight as of this encounter: 81.2 kg (179 lb).  Medication Reconciliation: complete  Maricel Elizabeth LPN    "

## 2020-03-19 NOTE — PROGRESS NOTES
Patient presents today to follow-up after her closed radial styloid fracture.  This occurred 10 days ago.  She was placed in a thumb spica cast a week ago and is tolerating this very well.  This swelling has diminished, she is regained full range of motion of her fingers including the MCP joints the thumb courses immobilized in the thumb spica position.  She complains of no discomfort.  X-rays demonstrate no change in the alignment which remains in an acceptable position.  She will continue with her fracture care and cast care instructions and follow-up in 1 week for repeat x-ray.  If all goes well she will follow-up 2 weeks after this next visit for cast removal and x-rays out of the cast.

## 2020-03-20 DIAGNOSIS — S52.501A CLOSED FRACTURE OF DISTAL END OF RIGHT RADIUS, UNSPECIFIED FRACTURE MORPHOLOGY, INITIAL ENCOUNTER: Primary | ICD-10-CM

## 2020-03-25 ENCOUNTER — ANCILLARY PROCEDURE (OUTPATIENT)
Dept: GENERAL RADIOLOGY | Facility: OTHER | Age: 60
End: 2020-03-25
Attending: PHYSICIAN ASSISTANT
Payer: COMMERCIAL

## 2020-03-25 ENCOUNTER — OFFICE VISIT (OUTPATIENT)
Dept: ORTHOPEDICS | Facility: OTHER | Age: 60
End: 2020-03-25
Attending: ORTHOPAEDIC SURGERY
Payer: COMMERCIAL

## 2020-03-25 VITALS
SYSTOLIC BLOOD PRESSURE: 115 MMHG | BODY MASS INDEX: 33.79 KG/M2 | HEART RATE: 69 BPM | DIASTOLIC BLOOD PRESSURE: 72 MMHG | OXYGEN SATURATION: 98 % | WEIGHT: 179 LBS | TEMPERATURE: 97.6 F | HEIGHT: 61 IN

## 2020-03-25 DIAGNOSIS — S52.501A CLOSED FRACTURE OF DISTAL END OF RIGHT RADIUS, UNSPECIFIED FRACTURE MORPHOLOGY, INITIAL ENCOUNTER: Primary | ICD-10-CM

## 2020-03-25 DIAGNOSIS — S52.501A CLOSED FRACTURE OF DISTAL END OF RIGHT RADIUS, UNSPECIFIED FRACTURE MORPHOLOGY, INITIAL ENCOUNTER: ICD-10-CM

## 2020-03-25 PROCEDURE — 99207 ZZC FRACTURE CARE IN GLOBAL PERIOD: CPT | Performed by: ORTHOPAEDIC SURGERY

## 2020-03-25 PROCEDURE — 73110 X-RAY EXAM OF WRIST: CPT | Mod: TC

## 2020-03-25 ASSESSMENT — PAIN SCALES - GENERAL: PAINLEVEL: NO PAIN (0)

## 2020-03-25 ASSESSMENT — MIFFLIN-ST. JEOR: SCORE: 1314.32

## 2020-03-25 NOTE — PROGRESS NOTES
Patient presents today to follow-up for her right radial styloid fracture.  She has no complaints of pain, the cast is fitting well.  X-rays demonstrate the fracture to be well aligned with no change in position and some early callus formation is noted.    Assessment and plan: Closed, minimally displaced, stable radial styloid fracture.  The plan is to continue her in the cast for 2 more weeks, at that time bring her back for cast removal and x-ray, probably will place her in a thumb spica splint at that time and allow her to begin some early rehabilitation.
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
## 2020-03-27 DIAGNOSIS — S52.501A CLOSED FRACTURE OF DISTAL END OF RIGHT RADIUS, UNSPECIFIED FRACTURE MORPHOLOGY, INITIAL ENCOUNTER: Primary | ICD-10-CM

## 2020-04-08 ENCOUNTER — ANCILLARY PROCEDURE (OUTPATIENT)
Dept: GENERAL RADIOLOGY | Facility: OTHER | Age: 60
End: 2020-04-08
Attending: PHYSICIAN ASSISTANT
Payer: COMMERCIAL

## 2020-04-08 ENCOUNTER — OFFICE VISIT (OUTPATIENT)
Dept: ORTHOPEDICS | Facility: OTHER | Age: 60
End: 2020-04-08
Attending: ORTHOPAEDIC SURGERY
Payer: COMMERCIAL

## 2020-04-08 ENCOUNTER — HOSPITAL ENCOUNTER (OUTPATIENT)
Dept: GENERAL RADIOLOGY | Facility: HOSPITAL | Age: 60
End: 2020-04-08
Attending: ORTHOPAEDIC SURGERY
Payer: COMMERCIAL

## 2020-04-08 VITALS
BODY MASS INDEX: 33.82 KG/M2 | DIASTOLIC BLOOD PRESSURE: 75 MMHG | OXYGEN SATURATION: 97 % | TEMPERATURE: 98.6 F | WEIGHT: 179 LBS | HEART RATE: 70 BPM | SYSTOLIC BLOOD PRESSURE: 110 MMHG

## 2020-04-08 DIAGNOSIS — S52.501A CLOSED FRACTURE OF DISTAL END OF RIGHT RADIUS, UNSPECIFIED FRACTURE MORPHOLOGY, INITIAL ENCOUNTER: ICD-10-CM

## 2020-04-08 DIAGNOSIS — S52.501A CLOSED FRACTURE OF DISTAL END OF RIGHT RADIUS, UNSPECIFIED FRACTURE MORPHOLOGY, INITIAL ENCOUNTER: Primary | ICD-10-CM

## 2020-04-08 PROCEDURE — 73110 X-RAY EXAM OF WRIST: CPT | Mod: TC

## 2020-04-08 PROCEDURE — 99207 ZZC FRACTURE CARE IN GLOBAL PERIOD: CPT | Performed by: ORTHOPAEDIC SURGERY

## 2020-04-08 ASSESSMENT — PAIN SCALES - GENERAL: PAINLEVEL: MILD PAIN (3)

## 2020-04-08 NOTE — PROGRESS NOTES
Patient presents today follow-up for her right distal radius fracture, this was a fracture through the radial styloid that was minimally displaced.  At the present time she has mild tenderness over the fracture site, her skin is in good condition.  She is neurovascularly intact and the x-ray demonstrates a fracture to be healing in an acceptable position.  The plan is to place her in a removable thumb spica splint and allow her to begin some gentle range of motion.  If she has any difficulty regaining her motion or any increasing pain, she should probably follow-up for repeat evaluation.

## 2020-04-08 NOTE — NURSING NOTE
"Chief Complaint   Patient presents with     Musculoskeletal Problem       Initial /75 (BP Location: Left arm, Patient Position: Sitting, Cuff Size: Adult Regular)   Pulse 70   Temp 98.6  F (37  C) (Tympanic)   Wt 81.2 kg (179 lb)   SpO2 97%   BMI 33.82 kg/m   Estimated body mass index is 33.82 kg/m  as calculated from the following:    Height as of 3/25/20: 1.549 m (5' 1\").    Weight as of this encounter: 81.2 kg (179 lb).  Medication Reconciliation: complete      Maricel Elizabeth LPN    "

## 2020-07-07 ENCOUNTER — ANCILLARY PROCEDURE (OUTPATIENT)
Dept: GENERAL RADIOLOGY | Facility: OTHER | Age: 60
End: 2020-07-07
Attending: PHYSICIAN ASSISTANT
Payer: COMMERCIAL

## 2020-07-07 ENCOUNTER — OFFICE VISIT (OUTPATIENT)
Dept: ORTHOPEDICS | Facility: OTHER | Age: 60
End: 2020-07-07
Attending: ORTHOPAEDIC SURGERY
Payer: COMMERCIAL

## 2020-07-07 VITALS
DIASTOLIC BLOOD PRESSURE: 74 MMHG | HEIGHT: 62 IN | HEART RATE: 58 BPM | WEIGHT: 182 LBS | BODY MASS INDEX: 33.49 KG/M2 | OXYGEN SATURATION: 98 % | SYSTOLIC BLOOD PRESSURE: 122 MMHG | RESPIRATION RATE: 14 BRPM | TEMPERATURE: 96.5 F

## 2020-07-07 DIAGNOSIS — S52.501A CLOSED FRACTURE OF DISTAL END OF RIGHT RADIUS, UNSPECIFIED FRACTURE MORPHOLOGY, INITIAL ENCOUNTER: ICD-10-CM

## 2020-07-07 DIAGNOSIS — S52.501A CLOSED FRACTURE OF DISTAL END OF RIGHT RADIUS, UNSPECIFIED FRACTURE MORPHOLOGY, INITIAL ENCOUNTER: Primary | ICD-10-CM

## 2020-07-07 DIAGNOSIS — S52.501D CLOSED FRACTURE OF DISTAL END OF RIGHT RADIUS WITH ROUTINE HEALING, UNSPECIFIED FRACTURE MORPHOLOGY, SUBSEQUENT ENCOUNTER: Primary | ICD-10-CM

## 2020-07-07 PROCEDURE — 73110 X-RAY EXAM OF WRIST: CPT | Mod: TC

## 2020-07-07 PROCEDURE — 99213 OFFICE O/P EST LOW 20 MIN: CPT | Performed by: ORTHOPAEDIC SURGERY

## 2020-07-07 ASSESSMENT — PAIN SCALES - GENERAL: PAINLEVEL: NO PAIN (0)

## 2020-07-07 ASSESSMENT — MIFFLIN-ST. JEOR: SCORE: 1335.86
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
## 2020-07-07 NOTE — NURSING NOTE
"Chief Complaint   Patient presents with     Musculoskeletal Problem     right wrist follow up       Initial /74 (BP Location: Left arm, Cuff Size: Adult Regular)   Pulse 58   Temp 96.5  F (35.8  C)   Resp 14   Ht 1.562 m (5' 1.5\")   Wt 82.6 kg (182 lb)   SpO2 98%   BMI 33.83 kg/m   Estimated body mass index is 33.83 kg/m  as calculated from the following:    Height as of this encounter: 1.562 m (5' 1.5\").    Weight as of this encounter: 82.6 kg (182 lb).  Medication Reconciliation: complete  Holly Styles LPN  "

## 2020-07-31 ENCOUNTER — OFFICE VISIT (OUTPATIENT)
Dept: FAMILY MEDICINE | Facility: OTHER | Age: 60
End: 2020-07-31
Attending: FAMILY MEDICINE
Payer: COMMERCIAL

## 2020-07-31 VITALS
TEMPERATURE: 97.5 F | HEART RATE: 62 BPM | DIASTOLIC BLOOD PRESSURE: 70 MMHG | OXYGEN SATURATION: 97 % | HEIGHT: 61 IN | RESPIRATION RATE: 20 BRPM | BODY MASS INDEX: 34.55 KG/M2 | SYSTOLIC BLOOD PRESSURE: 118 MMHG | WEIGHT: 183 LBS

## 2020-07-31 DIAGNOSIS — Z12.31 OTHER SCREENING MAMMOGRAM: ICD-10-CM

## 2020-07-31 DIAGNOSIS — Z23 NEED FOR VACCINATION: ICD-10-CM

## 2020-07-31 DIAGNOSIS — R73.09 ELEVATED GLUCOSE: ICD-10-CM

## 2020-07-31 DIAGNOSIS — Z11.59 ENCOUNTER FOR SCREENING FOR OTHER VIRAL DISEASES: ICD-10-CM

## 2020-07-31 DIAGNOSIS — E78.5 HYPERLIPIDEMIA WITH TARGET LDL LESS THAN 130: ICD-10-CM

## 2020-07-31 DIAGNOSIS — Z00.00 ROUTINE GENERAL MEDICAL EXAMINATION AT A HEALTH CARE FACILITY: ICD-10-CM

## 2020-07-31 DIAGNOSIS — Z12.11 ENCOUNTER FOR SCREENING COLONOSCOPY: Primary | ICD-10-CM

## 2020-07-31 DIAGNOSIS — E55.9 HYPOVITAMINOSIS D: ICD-10-CM

## 2020-07-31 DIAGNOSIS — E53.8 VITAMIN B12 DEFICIENCY (NON ANEMIC): ICD-10-CM

## 2020-07-31 LAB
ALBUMIN SERPL-MCNC: 3.7 G/DL (ref 3.4–5)
ALP SERPL-CCNC: 78 U/L (ref 40–150)
ALT SERPL W P-5'-P-CCNC: 23 U/L (ref 0–50)
ANION GAP SERPL CALCULATED.3IONS-SCNC: 4 MMOL/L (ref 3–14)
AST SERPL W P-5'-P-CCNC: 18 U/L (ref 0–45)
BILIRUB SERPL-MCNC: 0.5 MG/DL (ref 0.2–1.3)
BUN SERPL-MCNC: 15 MG/DL (ref 7–30)
CALCIUM SERPL-MCNC: 8.8 MG/DL (ref 8.5–10.1)
CHLORIDE SERPL-SCNC: 108 MMOL/L (ref 94–109)
CHOLEST SERPL-MCNC: 273 MG/DL
CO2 SERPL-SCNC: 28 MMOL/L (ref 20–32)
CREAT SERPL-MCNC: 0.71 MG/DL (ref 0.52–1.04)
EST. AVERAGE GLUCOSE BLD GHB EST-MCNC: 120 MG/DL
GFR SERPL CREATININE-BSD FRML MDRD: >90 ML/MIN/{1.73_M2}
GLUCOSE SERPL-MCNC: 90 MG/DL (ref 70–99)
HBA1C MFR BLD: 5.8 % (ref 0–5.6)
HDLC SERPL-MCNC: 62 MG/DL
LDLC SERPL CALC-MCNC: 173 MG/DL
NONHDLC SERPL-MCNC: 211 MG/DL
POTASSIUM SERPL-SCNC: 3.7 MMOL/L (ref 3.4–5.3)
PROT SERPL-MCNC: 7.4 G/DL (ref 6.8–8.8)
SODIUM SERPL-SCNC: 140 MMOL/L (ref 133–144)
TRIGL SERPL-MCNC: 189 MG/DL
TSH SERPL DL<=0.005 MIU/L-ACNC: 1.39 MU/L (ref 0.4–4)

## 2020-07-31 PROCEDURE — 86803 HEPATITIS C AB TEST: CPT | Performed by: FAMILY MEDICINE

## 2020-07-31 PROCEDURE — 82607 VITAMIN B-12: CPT | Performed by: FAMILY MEDICINE

## 2020-07-31 PROCEDURE — 87389 HIV-1 AG W/HIV-1&-2 AB AG IA: CPT | Performed by: FAMILY MEDICINE

## 2020-07-31 PROCEDURE — 80061 LIPID PANEL: CPT | Performed by: FAMILY MEDICINE

## 2020-07-31 PROCEDURE — 36415 COLL VENOUS BLD VENIPUNCTURE: CPT | Performed by: FAMILY MEDICINE

## 2020-07-31 PROCEDURE — 90715 TDAP VACCINE 7 YRS/> IM: CPT | Performed by: FAMILY MEDICINE

## 2020-07-31 PROCEDURE — 99396 PREV VISIT EST AGE 40-64: CPT | Mod: 25 | Performed by: FAMILY MEDICINE

## 2020-07-31 PROCEDURE — 84443 ASSAY THYROID STIM HORMONE: CPT | Performed by: FAMILY MEDICINE

## 2020-07-31 PROCEDURE — 80053 COMPREHEN METABOLIC PANEL: CPT | Performed by: FAMILY MEDICINE

## 2020-07-31 PROCEDURE — 83036 HEMOGLOBIN GLYCOSYLATED A1C: CPT | Performed by: FAMILY MEDICINE

## 2020-07-31 PROCEDURE — 82306 VITAMIN D 25 HYDROXY: CPT | Performed by: FAMILY MEDICINE

## 2020-07-31 PROCEDURE — 87624 HPV HI-RISK TYP POOLED RSLT: CPT | Performed by: FAMILY MEDICINE

## 2020-07-31 PROCEDURE — 90471 IMMUNIZATION ADMIN: CPT | Performed by: FAMILY MEDICINE

## 2020-07-31 PROCEDURE — G0123 SCREEN CERV/VAG THIN LAYER: HCPCS | Performed by: FAMILY MEDICINE

## 2020-07-31 SDOH — HEALTH STABILITY: PHYSICAL HEALTH: ON AVERAGE, HOW MANY DAYS PER WEEK DO YOU ENGAGE IN MODERATE TO STRENUOUS EXERCISE (LIKE A BRISK WALK)?: 4 DAYS

## 2020-07-31 SDOH — SOCIAL STABILITY: SOCIAL INSECURITY: WITHIN THE LAST YEAR, HAVE YOU BEEN HUMILIATED OR EMOTIONALLY ABUSED IN OTHER WAYS BY YOUR PARTNER OR EX-PARTNER?: NO

## 2020-07-31 SDOH — SOCIAL STABILITY: SOCIAL INSECURITY: WITHIN THE LAST YEAR, HAVE YOU BEEN AFRAID OF YOUR PARTNER OR EX-PARTNER?: NO

## 2020-07-31 SDOH — SOCIAL STABILITY: SOCIAL INSECURITY
WITHIN THE LAST YEAR, HAVE TO BEEN RAPED OR FORCED TO HAVE ANY KIND OF SEXUAL ACTIVITY BY YOUR PARTNER OR EX-PARTNER?: NO

## 2020-07-31 SDOH — HEALTH STABILITY: PHYSICAL HEALTH: ON AVERAGE, HOW MANY MINUTES DO YOU ENGAGE IN EXERCISE AT THIS LEVEL?: 30 MIN

## 2020-07-31 SDOH — HEALTH STABILITY: MENTAL HEALTH: HOW OFTEN DO YOU HAVE A DRINK CONTAINING ALCOHOL?: 2-4 TIMES A MONTH

## 2020-07-31 SDOH — HEALTH STABILITY: MENTAL HEALTH: HOW MANY DRINKS CONTAINING ALCOHOL DO YOU HAVE ON A TYPICAL DAY WHEN YOU ARE DRINKING?: 1 OR 2

## 2020-07-31 SDOH — SOCIAL STABILITY: SOCIAL INSECURITY
WITHIN THE LAST YEAR, HAVE YOU BEEN KICKED, HIT, SLAPPED, OR OTHERWISE PHYSICALLY HURT BY YOUR PARTNER OR EX-PARTNER?: NO

## 2020-07-31 SDOH — SOCIAL STABILITY: SOCIAL INSECURITY
WITHIN THE LAST YEAR, HAVE YOU BEEN RAPED OR FORCED TO HAVE ANY KIND OF SEXUAL ACTIVITY BY YOUR PARTNER OR EX-PARTNER?: NO

## 2020-07-31 SDOH — HEALTH STABILITY: MENTAL HEALTH: HOW MANY STANDARD DRINKS CONTAINING ALCOHOL DO YOU HAVE ON A TYPICAL DAY?: 1 OR 2

## 2020-07-31 ASSESSMENT — ANXIETY QUESTIONNAIRES
6. BECOMING EASILY ANNOYED OR IRRITABLE: NOT AT ALL
5. BEING SO RESTLESS THAT IT IS HARD TO SIT STILL: NOT AT ALL
GAD7 TOTAL SCORE: 0
2. NOT BEING ABLE TO STOP OR CONTROL WORRYING: NOT AT ALL
7. FEELING AFRAID AS IF SOMETHING AWFUL MIGHT HAPPEN: NOT AT ALL
3. WORRYING TOO MUCH ABOUT DIFFERENT THINGS: NOT AT ALL
1. FEELING NERVOUS, ANXIOUS, OR ON EDGE: NOT AT ALL

## 2020-07-31 ASSESSMENT — MIFFLIN-ST. JEOR: SCORE: 1324.52

## 2020-07-31 ASSESSMENT — PATIENT HEALTH QUESTIONNAIRE - PHQ9
SUM OF ALL RESPONSES TO PHQ QUESTIONS 1-9: 0
5. POOR APPETITE OR OVEREATING: NOT AT ALL

## 2020-07-31 ASSESSMENT — PAIN SCALES - GENERAL: PAINLEVEL: NO PAIN (0)

## 2020-07-31 NOTE — NURSING NOTE
"Chief Complaint   Patient presents with     Physical       Initial /70 (BP Location: Right arm, Patient Position: Chair, Cuff Size: Adult Regular)   Pulse 62   Temp 97.5  F (36.4  C) (Tympanic)   Resp 20   Ht 1.537 m (5' 0.5\")   Wt 83 kg (183 lb)   SpO2 97%   BMI 35.15 kg/m   Estimated body mass index is 35.15 kg/m  as calculated from the following:    Height as of this encounter: 1.537 m (5' 0.5\").    Weight as of this encounter: 83 kg (183 lb).  Medication Reconciliation: complete  Tamia Alberto LPN    "

## 2020-08-01 LAB — VIT B12 SERPL-MCNC: 524 PG/ML (ref 193–986)

## 2020-08-01 ASSESSMENT — ANXIETY QUESTIONNAIRES: GAD7 TOTAL SCORE: 0

## 2020-08-03 LAB
DEPRECATED CALCIDIOL+CALCIFEROL SERPL-MC: 67 UG/L (ref 20–75)
HCV AB SERPL QL IA: NONREACTIVE
HIV 1+2 AB+HIV1 P24 AG SERPL QL IA: NONREACTIVE

## 2020-08-05 ENCOUNTER — ANCILLARY PROCEDURE (OUTPATIENT)
Dept: MAMMOGRAPHY | Facility: OTHER | Age: 60
End: 2020-08-05
Attending: FAMILY MEDICINE
Payer: COMMERCIAL

## 2020-08-05 DIAGNOSIS — Z12.31 OTHER SCREENING MAMMOGRAM: ICD-10-CM

## 2020-08-05 PROCEDURE — 77067 SCR MAMMO BI INCL CAD: CPT | Mod: TC

## 2020-08-05 PROCEDURE — 77063 BREAST TOMOSYNTHESIS BI: CPT | Mod: TC

## 2020-08-06 LAB
COPATH REPORT: NORMAL
PAP: NORMAL

## 2020-08-10 LAB
FINAL DIAGNOSIS: NORMAL
HPV HR 12 DNA CVX QL NAA+PROBE: NEGATIVE
HPV16 DNA SPEC QL NAA+PROBE: NEGATIVE
HPV18 DNA SPEC QL NAA+PROBE: NEGATIVE
SPECIMEN DESCRIPTION: NORMAL
SPECIMEN SOURCE CVX/VAG CYTO: NORMAL

## 2020-08-13 ENCOUNTER — TELEPHONE (OUTPATIENT)
Dept: SURGERY | Facility: OTHER | Age: 60
End: 2020-08-13

## 2020-08-13 NOTE — TELEPHONE ENCOUNTER
Referral received for colonoscopy.   This patient was approved by surgery education nurses for meet and greet colonoscopy and will not need a preop or consult appointment.   1st attempt to call patient to schedule. No answer. Left message for patient to return call.

## 2020-08-13 NOTE — TELEPHONE ENCOUNTER
Patient did return call and left voicemail message asking for a call back.    Attempted to return patient's call, but again got voicemail, so left another message for patient to call [phone] or [phone].

## 2020-10-26 ENCOUNTER — TELEPHONE (OUTPATIENT)
Dept: SURGERY | Facility: OTHER | Age: 60
End: 2020-10-26

## 2020-10-26 NOTE — LETTER
October 26, 2020          Sarah Espitia  [address] [place] [address]        Dear Sarah,     Our office has received a colonoscopy referral for colon cancer screening. We have made two or more unsuccessful attempts to schedule by phone. Please call when you are ready to schedule. Colonoscopy is typically recommended every 5-10 years, depending on your history, in order to prevent and detect colon cancer at its earliest stages.  Colon cancer is now the second leading cause of cancer death in the [place] for both men and women. There are over 130,000 new cases and 50,000 deaths per year from colon cancer.  Colonoscopies can prevent a large majority of these deaths. This test is not only looking for cancer, but also precancerous lesions. These lesions can be removed before they ever become cancer. You can be given some sedation which makes the test comfortable for most people. You may schedule an appointment to discuss the procedure first if you prefer. You may need a preop appointment prior to your procedure.     Colonoscopy is the best screening tool for colon cancer; however, if you do not wish to do a colonoscopy or cannot afford to do one at this time, there are other options.  One option is called a FIT test or Fecal Immunochemical Occult Blood Test  Which is a home stool sample kit that is mailed or returned to the clinic lab. This test can detect hidden bleeding in the lower colon and should be done every year. Another option is Cologuard which also involves collecting a stool sample at home. This test kit is shipped directly to your home and returned from any UPS location. It can identify altered DNA shed in the stool associated with the possibility of colon cancer or precancerous lesions and should be done every 3 years. Both tests are easy to do and do not require any dietary or medication restrictions and involve only one collection sample. If a positive result is obtained from either test,  you would be referred for a colonoscopy.    If you are under/uninsured, you may contact our Patient Financial Services Office at [phone] to determine which benefits you may qualify for. If you have completed either one of these tests or had a flexible sigmoidoscopy in the past five years at another facility, please have the records sent to our clinic so that we can best coordinate your care.  Please call us at [phone] if you have questions or would like arrange your colonoscopy procedure, consultation appointment, or discuss other options.       Sincerely;       [facility] Surgery Team

## 2020-10-26 NOTE — TELEPHONE ENCOUNTER
Referral received for colonoscopy. This is the 2nd attempt by phone to schedule meet and greet colonoscopy. No answer. Left message for patient to return call.  Letter sent requesting patient to call office to schedule colonoscopy/consult.    Referring provider notified that patient has not yet been scheduled for colonoscopy or consult after attempts have been made schedule by phone.

## 2020-12-20 ENCOUNTER — HEALTH MAINTENANCE LETTER (OUTPATIENT)
Age: 60
End: 2020-12-20

## 2021-01-18 ENCOUNTER — TELEPHONE (OUTPATIENT)
Dept: SURGERY | Facility: OTHER | Age: 61
End: 2021-01-18

## 2021-01-18 ENCOUNTER — PREP FOR PROCEDURE (OUTPATIENT)
Dept: SURGERY | Facility: OTHER | Age: 61
End: 2021-01-18

## 2021-01-18 DIAGNOSIS — Z86.0100 HISTORY OF COLONIC POLYPS: Primary | ICD-10-CM

## 2021-01-18 NOTE — TELEPHONE ENCOUNTER
Patient left voicemail inquiring about getting on the schedule for a colonoscopy. Referral received for colonoscopy. This patient was approved by surgery education nurses for meet and greet colonoscopy and will not need a preop appointment.   Patient scheduled for colonoscopy on 3/18/21 with Dr. Braun at [facility] with gatorade bowel prep.   Instructions given via phone and instructions mailed to patient with surgery handbook. Joanne Davis LPN

## 2021-03-11 ENCOUNTER — ANESTHESIA EVENT (OUTPATIENT)
Dept: SURGERY | Facility: HOSPITAL | Age: 61
End: 2021-03-11
Payer: COMMERCIAL

## 2021-03-11 NOTE — ANESTHESIA PREPROCEDURE EVALUATION
Anesthesia Pre-Procedure Evaluation    Patient: Sarah Espitia   MRN: [id number] : 1960        Preoperative Diagnosis: History of colonic polyps [Z86.010]   Procedure : Procedure(s):  Colonoscopy, possible biopsy, possible polypectomy     Past Medical History:   Diagnosis Date     Actinic keratosis 2013     Dyslipidemia 2013     Eczema 2011     GRISELDA (generalized anxiety disorder)      Hyperplastic colon polyp 2013      Past Surgical History:   Procedure Laterality Date     Bilateral tubal ligation        SECTION  1995    pregnancy      SECTION  2000    pregnancy (provider: Helena Serna)      SECTION  1999    pregnancy       SECTION  1996    pregnancy     COLONOSCOPY N/A 2015    Procedure: COLONOSCOPY;  Surgeon: Mariola Olsen MD;  Location: [facility]     colonoscopy with polypectomy  3/30/2009    outcome: hemorrhoids, repeat 5 years (provider: Akin Hedrick)     meniscus repair  2012    left     ORTHOPEDIC SURGERY  10/2016    right meniscus repair      No Known Allergies   Social History     Tobacco Use     Smoking status: Former Smoker     Packs/day: 1.50     Years: 16.00     Pack years: 24.00     Types: Cigarettes     Smokeless tobacco: Never Used     Tobacco comment: quit in    Substance Use Topics     Alcohol use: Yes     Frequency: 2-4 times a month     Drinks per session: 1 or 2     Comment: 1/month      Wt Readings from Last 1 Encounters:   20 83 kg (183 lb)        Anesthesia Evaluation   Pt has had prior anesthetic.     No history of anesthetic complications       ROS/MED HX  ENT/Pulmonary:     (+) tobacco use, Past use,     Neurologic:  - neg neurologic ROS     Cardiovascular:     (+) Dyslipidemia -----    METS/Exercise Tolerance: >4 METS    Hematologic:  - neg hematologic  ROS     Musculoskeletal:  - neg musculoskeletal ROS     GI/Hepatic:     (+) bowel prep,     Renal/Genitourinary:  - neg Renal ROS     Endo:      (+) Obesity,     Psychiatric/Substance Use:     (+) psychiatric history anxiety     Infectious Disease:  - neg infectious disease ROS     Malignancy:  - neg malignancy ROS     Other:  - neg other ROS          Physical Exam    Airway        Mallampati: II   TM distance: > 3 FB   Neck ROM: full   Mouth opening: > 3 cm    Respiratory Devices and Support         Dental  no notable dental history         Cardiovascular   cardiovascular exam normal       Rhythm and rate: regular and normal     Pulmonary   pulmonary exam normal        breath sounds clear to auscultation           OUTSIDE LABS:  CBC: No results found for: WBC, HGB, HCT, PLT  BMP:   Lab Results   Component Value Date     07/31/2020     08/25/2014    POTASSIUM 3.7 07/31/2020    POTASSIUM 3.9 08/25/2014    CHLORIDE 108 07/31/2020    CHLORIDE 102 08/25/2014    CO2 28 07/31/2020    CO2 28 08/25/2014    BUN 15 07/31/2020    BUN 16 08/25/2014    CR 0.71 07/31/2020    CR 0.75 08/25/2014    GLC 90 07/31/2020    GLC 86 08/25/2014     COAGS: No results found for: PTT, INR, FIBR  POC: No results found for: BGM, HCG, HCGS  HEPATIC:   Lab Results   Component Value Date    ALBUMIN 3.7 07/31/2020    PROTTOTAL 7.4 07/31/2020    ALT 23 07/31/2020    AST 18 07/31/2020    ALKPHOS 78 07/31/2020    BILITOTAL 0.5 07/31/2020     OTHER:   Lab Results   Component Value Date    A1C 5.8 (H) 07/31/2020    ADDIE 8.8 07/31/2020    TSH 1.39 07/31/2020    T4 8.3 08/25/2014    T3 104 08/25/2014       Anesthesia Plan    ASA Status:  2   NPO Status:  NPO Appropriate    Anesthesia Type: MAC.   Induction: Intravenous, Propofol.   Maintenance: Balanced.        Consents    Anesthesia Plan(s) and associated risks, benefits, and realistic alternatives discussed. Questions answered and patient/representative(s) expressed understanding.     - Discussed with:  Patient         Postoperative Care            Comments:                CHANDU Benson CRNA

## 2021-03-13 ENCOUNTER — OFFICE VISIT (OUTPATIENT)
Dept: FAMILY MEDICINE | Facility: OTHER | Age: 61
End: 2021-03-13
Attending: FAMILY MEDICINE
Payer: COMMERCIAL

## 2021-03-13 DIAGNOSIS — Z86.0100 HISTORY OF COLONIC POLYPS: ICD-10-CM

## 2021-03-13 LAB
SARS-COV-2 RNA RESP QL NAA+PROBE: NORMAL
SPECIMEN SOURCE: NORMAL

## 2021-03-13 PROCEDURE — U0003 INFECTIOUS AGENT DETECTION BY NUCLEIC ACID (DNA OR RNA); SEVERE ACUTE RESPIRATORY SYNDROME CORONAVIRUS 2 (SARS-COV-2) (CORONAVIRUS DISEASE [COVID-19]), AMPLIFIED PROBE TECHNIQUE, MAKING USE OF HIGH THROUGHPUT TECHNOLOGIES AS DESCRIBED BY CMS-2020-01-R: HCPCS | Performed by: FAMILY MEDICINE

## 2021-03-13 PROCEDURE — U0005 INFEC AGEN DETEC AMPLI PROBE: HCPCS | Performed by: FAMILY MEDICINE

## 2021-03-14 LAB
LABORATORY COMMENT REPORT: NORMAL
SARS-COV-2 RNA RESP QL NAA+PROBE: NEGATIVE
SPECIMEN SOURCE: NORMAL

## 2021-03-18 ENCOUNTER — HOSPITAL ENCOUNTER (OUTPATIENT)
Facility: HOSPITAL | Age: 61
Discharge: HOME | End: 2021-03-18
Attending: SURGERY | Admitting: SURGERY
Payer: COMMERCIAL

## 2021-03-18 ENCOUNTER — ANESTHESIA (OUTPATIENT)
Dept: SURGERY | Facility: HOSPITAL | Age: 61
End: 2021-03-18
Payer: COMMERCIAL

## 2021-03-18 VITALS
WEIGHT: 185 LBS | OXYGEN SATURATION: 99 % | BODY MASS INDEX: 34.04 KG/M2 | RESPIRATION RATE: 16 BRPM | HEART RATE: 56 BPM | TEMPERATURE: 98.1 F | SYSTOLIC BLOOD PRESSURE: 130 MMHG | DIASTOLIC BLOOD PRESSURE: 82 MMHG | HEIGHT: 62 IN

## 2021-03-18 DIAGNOSIS — Z86.0100 HISTORY OF COLONIC POLYPS: ICD-10-CM

## 2021-03-18 PROCEDURE — 45380 COLONOSCOPY AND BIOPSY: CPT | Mod: PT | Performed by: SURGERY

## 2021-03-18 PROCEDURE — 88305 TISSUE EXAM BY PATHOLOGIST: CPT | Mod: TC | Performed by: SURGERY

## 2021-03-18 PROCEDURE — 45385 COLONOSCOPY W/LESION REMOVAL: CPT | Performed by: NURSE ANESTHETIST, CERTIFIED REGISTERED

## 2021-03-18 PROCEDURE — 370N000017 HC ANESTHESIA TECHNICAL FEE, PER MIN: Performed by: SURGERY

## 2021-03-18 PROCEDURE — 250N000011 HC RX IP 250 OP 636: Performed by: NURSE ANESTHETIST, CERTIFIED REGISTERED

## 2021-03-18 PROCEDURE — 999N000141 HC STATISTIC PRE-PROCEDURE NURSING ASSESSMENT: Performed by: SURGERY

## 2021-03-18 PROCEDURE — 360N000075 HC SURGERY LEVEL 2, PER MIN: Performed by: SURGERY

## 2021-03-18 PROCEDURE — 258N000003 HC RX IP 258 OP 636: Performed by: NURSE ANESTHETIST, CERTIFIED REGISTERED

## 2021-03-18 PROCEDURE — 250N000009 HC RX 250: Performed by: NURSE ANESTHETIST, CERTIFIED REGISTERED

## 2021-03-18 PROCEDURE — 710N000012 HC RECOVERY PHASE 2, PER MINUTE: Performed by: SURGERY

## 2021-03-18 RX ORDER — NALOXONE HYDROCHLORIDE 0.4 MG/ML
0.2 INJECTION, SOLUTION INTRAMUSCULAR; INTRAVENOUS; SUBCUTANEOUS
Status: DISCONTINUED | OUTPATIENT
Start: 2021-03-18 | End: 2021-03-18 | Stop reason: HOSPADM

## 2021-03-18 RX ORDER — NALOXONE HYDROCHLORIDE 0.4 MG/ML
0.4 INJECTION, SOLUTION INTRAMUSCULAR; INTRAVENOUS; SUBCUTANEOUS
Status: DISCONTINUED | OUTPATIENT
Start: 2021-03-18 | End: 2021-03-18 | Stop reason: HOSPADM

## 2021-03-18 RX ORDER — ONDANSETRON 2 MG/ML
4 INJECTION INTRAMUSCULAR; INTRAVENOUS EVERY 30 MIN PRN
Status: DISCONTINUED | OUTPATIENT
Start: 2021-03-18 | End: 2021-03-18 | Stop reason: HOSPADM

## 2021-03-18 RX ORDER — LIDOCAINE 40 MG/G
CREAM TOPICAL
Status: DISCONTINUED | OUTPATIENT
Start: 2021-03-18 | End: 2021-03-18 | Stop reason: HOSPADM

## 2021-03-18 RX ORDER — SODIUM CHLORIDE, SODIUM LACTATE, POTASSIUM CHLORIDE, CALCIUM CHLORIDE 600; 310; 30; 20 MG/100ML; MG/100ML; MG/100ML; MG/100ML
INJECTION, SOLUTION INTRAVENOUS CONTINUOUS
Status: DISCONTINUED | OUTPATIENT
Start: 2021-03-18 | End: 2021-03-18 | Stop reason: HOSPADM

## 2021-03-18 RX ORDER — MEPERIDINE HYDROCHLORIDE 25 MG/ML
12.5 INJECTION INTRAMUSCULAR; INTRAVENOUS; SUBCUTANEOUS
Status: DISCONTINUED | OUTPATIENT
Start: 2021-03-18 | End: 2021-03-18 | Stop reason: HOSPADM

## 2021-03-18 RX ORDER — ONDANSETRON 4 MG/1
4 TABLET, ORALLY DISINTEGRATING ORAL EVERY 30 MIN PRN
Status: DISCONTINUED | OUTPATIENT
Start: 2021-03-18 | End: 2021-03-18 | Stop reason: HOSPADM

## 2021-03-18 RX ORDER — LIDOCAINE HYDROCHLORIDE 20 MG/ML
INJECTION, SOLUTION INFILTRATION; PERINEURAL PRN
Status: DISCONTINUED | OUTPATIENT
Start: 2021-03-18 | End: 2021-03-18

## 2021-03-18 RX ORDER — PROPOFOL 10 MG/ML
INJECTION, EMULSION INTRAVENOUS PRN
Status: DISCONTINUED | OUTPATIENT
Start: 2021-03-18 | End: 2021-03-18

## 2021-03-18 RX ORDER — FLUMAZENIL 0.1 MG/ML
0.2 INJECTION, SOLUTION INTRAVENOUS
Status: DISCONTINUED | OUTPATIENT
Start: 2021-03-18 | End: 2021-03-18 | Stop reason: HOSPADM

## 2021-03-18 RX ADMIN — PROPOFOL 20 MG: 10 INJECTION, EMULSION INTRAVENOUS at 07:59

## 2021-03-18 RX ADMIN — PROPOFOL 20 MG: 10 INJECTION, EMULSION INTRAVENOUS at 07:51

## 2021-03-18 RX ADMIN — PROPOFOL 20 MG: 10 INJECTION, EMULSION INTRAVENOUS at 07:54

## 2021-03-18 RX ADMIN — PROPOFOL 50 MG: 10 INJECTION, EMULSION INTRAVENOUS at 07:45

## 2021-03-18 RX ADMIN — LIDOCAINE HYDROCHLORIDE 40 MG: 20 INJECTION, SOLUTION INFILTRATION; PERINEURAL at 07:39

## 2021-03-18 RX ADMIN — PROPOFOL 20 MG: 10 INJECTION, EMULSION INTRAVENOUS at 07:57

## 2021-03-18 RX ADMIN — PROPOFOL 50 MG: 10 INJECTION, EMULSION INTRAVENOUS at 07:39

## 2021-03-18 RX ADMIN — PROPOFOL 50 MG: 10 INJECTION, EMULSION INTRAVENOUS at 07:43

## 2021-03-18 RX ADMIN — ONDANSETRON 4 MG: 4 TABLET, ORALLY DISINTEGRATING ORAL at 09:04

## 2021-03-18 RX ADMIN — PROPOFOL 50 MG: 10 INJECTION, EMULSION INTRAVENOUS at 07:41

## 2021-03-18 RX ADMIN — PROPOFOL 20 MG: 10 INJECTION, EMULSION INTRAVENOUS at 08:01

## 2021-03-18 RX ADMIN — PROPOFOL 20 MG: 10 INJECTION, EMULSION INTRAVENOUS at 07:48

## 2021-03-18 RX ADMIN — SODIUM CHLORIDE, POTASSIUM CHLORIDE, SODIUM LACTATE AND CALCIUM CHLORIDE: 600; 310; 30; 20 INJECTION, SOLUTION INTRAVENOUS at 07:19

## 2021-03-18 ASSESSMENT — LIFESTYLE VARIABLES: TOBACCO_USE: 1

## 2021-03-18 ASSESSMENT — MIFFLIN-ST. JEOR: SCORE: 1344.46

## 2021-03-18 NOTE — OP NOTE
Sarah Espitia MRN# [id number]   YOB: 1960 Age: 60 year old      Date of Admission:  3/18/2021  Date of Service:   3/18/21    Primary care provider: Mary Chapa    PREOPERATIVE DIAGNOSIS:  Colon Cancer Screening        POSTOPERATIVE DIAGNOSIS:  Colon polyp x 1          PROCEDURE:  Colonoscopy with polypectomy            INDICATIONS:  Screening colonoscopy.      Specimen:   ID Type Source Tests Collected by Time Destination   A : @ 40cm Polyp Colon SURGICAL PATHOLOGY EXAM Yasir Braun MD 3/18/2021  7:56 AM        SURGEON: Yasir Braun MD    DESCRIPTION OF PROCEDURE: Sarah Espitia was brought into the endoscopy suite and placed in the left lateral decubitus position. After preprocedural pause and attended monitored anesthesia was administered, the external anus was inspected and was noted to have a right lateral external hemorrhoid. Digital rectal exam was normal. The colonoscope was inserted and advanced under direct visualization to the level of the cecum which was identified by the appendiceal orifice and the ileocecal valve. The prep was excellent.. Upon slow withdrawal of the colonoscope, approximately 95% of the mucosa was directly visualized. There was one small polyp at 40cm that was removed with biopsy forceps.  The rest of the colon was without mucosal abnormality. There was no evidence of further polyps, inflammation, bleeding or AVMs. Retroflexion of the rectum was normal. The extra air was removed from the colon, and the colonoscope withdrawn. The patient tolerated the procedure well and was taken to postanesthesia care unit.     We invite the patient to return in 5 years for follow up screening evaluation, pending pathology related to history of polyps.    Yasir Braun MD

## 2021-03-18 NOTE — H&P
Surgery Consult Clinic Note      RE: Sarah Espitia  : 1960        Chief Complaint:  Colon cancer screening  Personal history of colon polyps    History of Present Illness:  I am seeing Sarah Espitia at the request of Dr. Chapa for evaluation regarding meet and greet screening colonoscopy.  She has had two previous colonoscopies,  and  with polyps removed each time.  She denies family history of colon or rectal cancer, blood in stool, changes in bowel habits, weight loss, abdominal pain.  Previous abdominal surgeries include  x5..   She has no questions regarding  bowel prep.  Reports passing clear yellow liquid stools today.     She specifically denies fevers, chills, nausea, vomiting, chest pain, shortness of breath, palpitations, sore throat, cough, or generalized feeling ill.   She had a negative COVID 19 PCR test on 3/13/2021.    Medical history:  Past Medical History:   Diagnosis Date     Actinic keratosis 2013     Dyslipidemia 2013     Eczema 2011     GRISELDA (generalized anxiety disorder)      Hyperplastic colon polyp 2013       Surgical history:  Past Surgical History:   Procedure Laterality Date     Bilateral tubal ligation        SECTION  1995    pregnancy      SECTION  2000    pregnancy (provider: Helena Serna)      SECTION  1999    pregnancy       SECTION  1996    pregnancy     COLONOSCOPY N/A 2015    Procedure: COLONOSCOPY;  Surgeon: Mariola Olsen MD;  Location: [facility]     colonoscopy with polypectomy  3/30/2009    outcome: hemorrhoids, repeat 5 years (provider: Akin Hedrick)     meniscus repair  2012    left     ORTHOPEDIC SURGERY  10/2016    right meniscus repair       Family history:  Family History   Problem Relation Age of Onset     Family History Negative Brother      Pancreatitis Father 91        blocked pancreatic duct     C.A.D. Mother      Cerebrovascular Disease Mother 63        CVA      "Lipids Mother         hyperlipidemia     Hypertension Mother      Diabetes Maternal Grandmother      Kidney Disease Maternal Grandmother      Obesity Maternal Grandmother      Cancer Sister 30        leukemia - in remission, had chemo     Family History Negative Brother      Family History Negative Brother      Family History Negative Brother      Lung Cancer Paternal Grandfather         neg tobacco     Cardiovascular Maternal Grandfather 60        AMI     Dementia Paternal Grandmother        Medications:  Prior to Admission medications    Not on File       Allergies:  The patient has No Known Allergies.  .  Social history:  Social History     Tobacco Use     Smoking status: Former Smoker     Packs/day: 1.50     Years: 16.00     Pack years: 24.00     Types: Cigarettes     Smokeless tobacco: Never Used     Tobacco comment: quit in 1993   Substance Use Topics     Alcohol use: Yes     Frequency: 2-4 times a month     Drinks per session: 1 or 2     Comment: 1/month     Marital status: .    Review of Systems:    Constitutional: Negative for fever, chills.   HENT: Negative for ear pain, congestion, sore throat, and ear discharge.    Eyes: Negative for blurred vision, double vision.   Respiratory: Negative for cough, hemoptysis, shortness of breath, wheezing and stridor.    Cardiovascular: Negative for chest pain, palpitations and orthopnea.   Gastrointestinal: Negative for heartburn, nausea, vomiting, abdominal pain and blood in stool.   Genitourinary: Negative for urgency, frequency   Musculoskeletal: Negative for myalgias, back pain and joint pain.   Neurological: Negative for tingling, speech change and headaches.   Endo/Heme/Allergies: Does not bruise/bleed easily.   Psychiatric/Behavioral: Negative for depression, suicidal ideas and hallucinations. The patient is not nervous/anxious.    Physical Examination:  /84   Pulse 81   Temp 98.1  F (36.7  C) (Tympanic)   Resp 18   Ht 1.562 m (5' 1.5\")   Wt " 83.9 kg (185 lb)   SpO2 96%   BMI 34.39 kg/m    General: Alert and orientedx4, no acute distress, well-developed/well-nourished, ambulating without assistance  HEENT: normocephalic atraumatic, extraocular movements intact, sclerae anicteric, Trachea mideline  Chest:   Clear to auscultation bilaterally.  Cardiac: S1S2 , regular rate and rhythm without additional sounds  Abdomen: Soft, non-tender, non-distended  Extremities: Cursory exam unremarkable.  No peripheral edema noted.  Skin: Warm, dry, < 2 sec cap refill  Neuro: CN 2-12 grossly intact, no focal deficit, GCS 15  Psych: Pleasant, calm, asks appropriate questions      Assessment/Plan:  #1 Colonoscopy  #2 Personal history of colon polyps    Sarah Espitia and I had a discussion about colonoscopies.  The indications, risks, benefits, althernatives and technical aspects of whole colon colonoscopy were outlined with risks including, but not limited to, perforation, bleeding and inability to visualize entire colon.  Management of each was reviewed including the risk for life saving surgery and possible admittance to the hospital.  Her questions were asked and answered.  We will proceed with colonoscopy with Dr. Braun as scheduled.  Keri Ro [facility]  [address] [place] [address]    [id number]    Referring Provider:  No referring provider defined for this encounter.     Primary Care Provider:  Mary Chapa

## 2021-03-18 NOTE — OR NURSING
Patient and responsible adult given discharge instructions with no questions regarding instructions. Antoinette score 18/18. Pain level 0/10.  Discharged from unit via w. Patient discharged to home.

## 2021-03-18 NOTE — OR NURSING
"While attempting to get dressed, pt vomited 100cc of clear fluid.  States \"better\" after emesis.  Will medicate with Zofran ODT after pt done getting dressed.  "

## 2021-03-18 NOTE — DISCHARGE INSTRUCTIONS

## 2021-03-18 NOTE — ANESTHESIA POSTPROCEDURE EVALUATION
Patient: Sarah Espitia    Procedure(s):  Colonoscopy with polypectomy    Diagnosis:History of colonic polyps [Z86.010]  Diagnosis Additional Information: No value filed.    Anesthesia Type:  MAC    Note:  Disposition: Outpatient   Postop Pain Control: Uneventful            Sign Out: Well controlled pain   PONV: No   Neuro/Psych: Uneventful            Sign Out: Acceptable/Baseline neuro status   Airway/Respiratory: Uneventful            Sign Out: Acceptable/Baseline resp. status   CV/Hemodynamics: Uneventful            Sign Out: Acceptable CV status   Other NRE: NONE   DID A NON-ROUTINE EVENT OCCUR? No         Last vitals:  Vitals:    03/18/21 0840 03/18/21 0845 03/18/21 0850   BP: 145/83 (!) 144/104 130/82   Pulse:  52 56   Resp: 16 16 16   Temp:      SpO2: 98% 99% 99%       Last vitals prior to Anesthesia Care Transfer:  CRNA VITALS  3/18/2021 0732 - 3/18/2021 0832      3/18/2021             Resp Rate (set):  8          Electronically Signed By: CHANDU Holt CRNA  March 18, 2021  9:40 AM

## 2021-03-18 NOTE — ANESTHESIA CARE TRANSFER NOTE
Patient: Sarah Espitia    Procedure(s):  Colonoscopy with polypectomy    Diagnosis: History of colonic polyps [Z86.010]  Diagnosis Additional Information: No value filed.    Anesthesia Type:   MAC     Note:      Level of Consciousness: awake  Oxygen Supplementation: nasal cannula  Level of Supplemental Oxygen (L/min / FiO2): 2  Independent Airway: airway patency satisfactory and stable  Dentition: dentition unchanged  Vital Signs Stable: post-procedure vital signs reviewed and stable  Report to RN Given: handoff report given  Patient transferred to: Phase II      post-procedure handoff checklist not completed for medical reasons    Vitals: (Last set prior to Anesthesia Care Transfer)  CRNA VITALS  3/18/2021 0732 - 3/18/2021 0815      3/18/2021             Resp Rate (set):  8        Electronically Signed By: CHANDU Benson CRNA  March 18, 2021  8:15 AM

## 2021-03-19 LAB — COPATH REPORT: NORMAL

## 2021-04-19 ENCOUNTER — TRANSFERRED RECORDS (OUTPATIENT)
Dept: HEALTH INFORMATION MANAGEMENT | Facility: CLINIC | Age: 61
End: 2021-04-19

## 2021-08-04 NOTE — PROGRESS NOTES
SUBJECTIVE:   CC: Sarah Espitia is an 60 year old woman who presents for preventive health visit.       Patient has been advised of split billing requirements and indicates understanding: Yes  Healthy Habits:     Getting at least 3 servings of Calcium per day:  Yes    Bi-annual eye exam:  Yes    Dental care twice a year:  Yes    Sleep apnea or symptoms of sleep apnea:  None    Diet:  Regular (no restrictions)    Frequency of exercise:  2-3 days/week    Duration of exercise:  15-30 minutes    Taking medications regularly:  Not Applicable    PHQ-2 Total Score: 0    Additional concerns today:  No          Hyperlipidemia Follow-Up      Are you regularly taking any medication or supplement to lower your cholesterol?   No    Are you having muscle aches or other side effects that you think could be caused by your cholesterol lowering medication?  n/a      Today's PHQ-2 Score:   PHQ-2 ( 1999 [employer]) 8/5/2021   Q1: Little interest or pleasure in doing things 0   Q2: Feeling down, depressed or hopeless 0   PHQ-2 Score 0   Q1: Little interest or pleasure in doing things Not at all   Q2: Feeling down, depressed or hopeless Not at all   PHQ-2 Score 0       Abuse: Current or Past (Physical, Sexual or Emotional) - No  Do you feel safe in your environment? Yes    Have you ever done Advance Care Planning? (For example, a Health Directive, POLST, or a discussion with a medical provider or your loved ones about your wishes): No, advance care planning information given to patient to review.  Patient declined advance care planning discussion at this time.    Social History     Tobacco Use     Smoking status: Former Smoker     Packs/day: 1.50     Years: 16.00     Pack years: 24.00     Types: Cigarettes     Smokeless tobacco: Never Used     Tobacco comment: quit in 1993   Substance Use Topics     Alcohol use: Yes     Comment: 1/month         No flowsheet data found.    Reviewed orders with patient.  Reviewed health maintenance and  updated orders accordingly - Yes  Lab work is in process  Labs reviewed in EPIC  BP Readings from Last 3 Encounters:   21 124/80   21 130/82   20 118/70    Wt Readings from Last 3 Encounters:   21 86.4 kg (190 lb 6.4 oz)   21 83.9 kg (185 lb)   20 83 kg (183 lb)                  Patient Active Problem List   Diagnosis     Advanced care planning/counseling discussion     Hyperlipidemia with target LDL less than 130     Hypovitaminosis D     History of colonic polyps     ACP (advance care planning)     Other closed intra-articular fracture of distal end of right radius, initial encounter     Elevated glucose     Vitamin B12 deficiency (non anemic)     Other eczema     Past Surgical History:   Procedure Laterality Date     Bilateral tubal ligation        SECTION  1995    pregnancy      SECTION  2000    pregnancy (provider: Helena Serna)      SECTION  1999    pregnancy       SECTION  1996    pregnancy     COLONOSCOPY N/A 2015    Procedure: COLONOSCOPY;  Surgeon: Mariola Olsen MD;  Location: [facility]     COLONOSCOPY N/A 2021    due in   Procedure: Colonoscopy with polypectomy;  Surgeon: Yasir Braun MD;  Location: [facility]     colonoscopy with polypectomy  2009    outcome: hemorrhoids, repeat 5 years (provider: Akin Hedrick)     meniscus repair  2012    left     ORTHOPEDIC SURGERY  10/2016    right meniscus repair       Social History     Tobacco Use     Smoking status: Former Smoker     Packs/day: 1.50     Years: 16.00     Pack years: 24.00     Types: Cigarettes     Smokeless tobacco: Never Used     Tobacco comment: quit in    Substance Use Topics     Alcohol use: Yes     Comment: 2-3/wk     Family History   Problem Relation Age of Onset     Pancreatitis Father 91        blocked pancreatic duct     C.A.D. Mother         s/p stenting -- negative tobacco     Cerebrovascular Disease Mother 63        CVA      Lipids Mother         hyperlipidemia     Hypertension Mother      Diabetes Type 2  Mother      Cancer Sister 30        leukemia - in remission, had chemo     Family History Negative Brother      Family History Negative Brother      Family History Negative Brother      Family History Negative Brother      Diabetes Maternal Grandmother      Kidney Disease Maternal Grandmother      Obesity Maternal Grandmother      Cardiovascular Maternal Grandfather 60        AMI     Dementia Paternal Grandmother      Lung Cancer Paternal Grandfather         neg tobacco         Current Outpatient Medications   Medication Sig Dispense Refill     triamcinolone (KENALOG) 0.1 % external cream Apply topically 2 times daily as needed for irritation 30 g 0     No Known Allergies    Breast Cancer Screening:  Any new diagnosis of family breast, ovarian, or bowel cancer? No    FHS-7: No flowsheet data found.    Mammogram Screening: Recommended mammography every 1-2 years with patient discussion and risk factor consideration  Pertinent mammograms are reviewed under the imaging tab.    History of abnormal Pap smear: NO - age 30-65 PAP every 5 years with negative HPV co-testing recommended  PAP / HPV Latest Ref Rng & Units 2020   PAP (Historical) - NIL NIL   HPV16 NEG:Negative Negative -   HPV18 NEG:Negative Negative -   HRHPV NEG:Negative Negative -     Reviewed and updated as needed this visit by clinical staff  Tobacco  Allergies  Meds   Med Hx  Surg Hx  Fam Hx  Soc Hx        Reviewed and updated as needed this visit by Provider                Past Medical History:   Diagnosis Date     Actinic keratosis 2013     Dyslipidemia 2013     Eczema 2011     GRISELDA (generalized anxiety disorder)      Hyperplastic colon polyp 2013     Tubular adenoma of colon 2021    repeat       Past Surgical History:   Procedure Laterality Date     Bilateral tubal ligation        SECTION  1995    pregnancy  "     SECTION  2000    pregnancy (provider: Helena Serna)      SECTION  1999    pregnancy       SECTION  1996    pregnancy     COLONOSCOPY N/A 2015    Procedure: COLONOSCOPY;  Surgeon: Mariola Olsen MD;  Location: [facility]     COLONOSCOPY N/A 2021    due in   Procedure: Colonoscopy with polypectomy;  Surgeon: Yasir Braun MD;  Location: [facility]     colonoscopy with polypectomy  2009    outcome: hemorrhoids, repeat 5 years (provider: Akin Hedrick)     meniscus repair  2012    left     ORTHOPEDIC SURGERY  10/2016    right meniscus repair     OB History    Para Term  AB Living   4 4 4 0 0 4   SAB TAB Ectopic Multiple Live Births   0 0 0 0 0      # Outcome Date GA Lbr Ben/2nd Weight Sex Delivery Anes PTL Lv   4 Term      -SEC      3 Term      -SEC      2 Term      -SEC      1 Term      -SEC         Obstetric Comments   Breast fed       Review of Systems  CONSTITUTIONAL: NEGATIVE for fever, chills, change in weight  INTEGUMENTARY/SKIN: NEGATIVE for worrisome rashes, moles or lesions  EYES: NEGATIVE for vision changes or irritation  ENT: NEGATIVE for ear, mouth and throat problems  RESP: NEGATIVE for significant cough or SOB  BREAST: NEGATIVE for masses, tenderness or discharge  CV: NEGATIVE for chest pain, palpitations or peripheral edema  GI: NEGATIVE for nausea, abdominal pain, heartburn, or change in bowel habits  : NEGATIVE for unusual urinary or vaginal symptoms. No vaginal bleeding.  MUSCULOSKELETAL: NEGATIVE for significant arthralgias or myalgia  NEURO: NEGATIVE for weakness, dizziness or paresthesias  PSYCHIATRIC: NEGATIVE for changes in mood or affect      OBJECTIVE:   /80   Pulse 62   Temp 97.6  F (36.4  C)   Resp 18   Ht 1.53 m (5' 0.24\")   Wt 86.4 kg (190 lb 6.4 oz)   SpO2 98%   BMI 36.89 kg/m    Physical Exam  GENERAL APPEARANCE: healthy, alert and no distress  EYES: Eyes grossly " normal to inspection, PERRL and conjunctivae and sclerae normal  HENT: ear canals and TM's normal, nose and mouth without ulcers or lesions, oropharynx clear and oral mucous membranes moist  NECK: no adenopathy, no asymmetry, masses, or scars and thyroid normal to palpation  RESP: lungs clear to auscultation - no rales, rhonchi or wheezes  BREAST: normal without masses, tenderness or nipple discharge and no palpable axillary masses or adenopathy  CV: regular rate and rhythm, normal S1 S2, no S3 or S4, no murmur, click or rub, no peripheral edema and peripheral pulses strong  ABDOMEN: soft, nontender, no hepatosplenomegaly, no masses and bowel sounds normal   (female): normal female external genitalia, normal urethral meatus, vaginal mucosal atrophy noted, normal cervix, adnexae, and uterus without masses or abnormal discharge  MS: no musculoskeletal defects are noted and gait is age appropriate without ataxia  SKIN: no suspicious lesions or rashes  NEURO: Normal strength and tone, sensory exam grossly normal, mentation intact and speech normal  PSYCH: mentation appears normal and affect normal/bright    Diagnostic Test Results:  Labs reviewed in Epic  Results for orders placed or performed in visit on 08/09/21   Comprehensive metabolic panel     Status: None (Preliminary result)   Result Value Ref Range    Sodium 141 133 - 144 mmol/L    Potassium 4.2 3.4 - 5.3 mmol/L    Chloride 109 94 - 109 mmol/L    Carbon Dioxide (CO2)      Anion Gap      Urea Nitrogen      Creatinine      Calcium      Glucose      Alkaline Phosphatase      AST      ALT      Protein Total      Albumin      Bilirubin Total      GFR Estimate     Hemoglobin A1c     Status: None   Result Value Ref Range    Estimated Average Glucose 111 mg/dL    Hemoglobin A1C 5.5 0.0 - 5.6 %       ASSESSMENT/PLAN:   (Z00.00) Routine general medical examination at a health care facility  (primary encounter diagnosis)  Comment:   Plan: follow-up 1 year    (E78.5)  "Hyperlipidemia with target LDL less than 130  Comment:   Plan: Comprehensive metabolic panel, Lipid Profile         (Chol, Trig, HDL, LDL calc)        Fasting lipids discussed  She declines medication  Start fish oil   The 10-year ASCVD risk score (Symone FORMAN Jr., et al., 2013) is: 4.7%    Values used to calculate the score:      Age: 60 years      Sex: Female      Is Non- : No      Diabetic: No      Tobacco smoker: No      Systolic Blood Pressure: 124 mmHg      Is BP treated: No      HDL Cholesterol: 57 mg/dL      Total Cholesterol: 283 mg/dL    (E55.9) Hypovitaminosis D  Comment:   Plan: labs were good last summer    (E53.8) Vitamin B12 deficiency (non anemic)  Comment:   Plan: Vitamin B12          (R73.09) Elevated glucose  Comment:   Plan: Hemoglobin A1c        improved    (Z12.31) Other screening mammogram  Comment:   Plan: MA SCREENING DIGITAL BILATERAL ([place])          (L30.8) Other eczema  Comment:   Plan: triamcinolone (KENALOG) 0.1 % external cream          (Z23) Need for prophylactic vaccination and inoculation against influenza  Comment:   Plan: ZOSTER VACCINE RECOMBINANT ADJUVANTED IM NJX            COUNSELING:  Reviewed preventive health counseling, as reflected in patient instructions  Special attention given to:        Regular exercise       Healthy diet/nutrition       Vision screening       Hearing screening       Colon cancer screening       Advance Care Planning    Estimated body mass index is 36.89 kg/m  as calculated from the following:    Height as of this encounter: 1.53 m (5' 0.24\").    Weight as of this encounter: 86.4 kg (190 lb 6.4 oz).    Weight management plan: Discussed healthy diet and exercise guidelines    She reports that she has quit smoking. Her smoking use included cigarettes. She has a 24.00 pack-year smoking history. She has never used smokeless tobacco.      Counseling Resources:  ATP IV Guidelines  Pooled Cohorts Equation Calculator  Breast Cancer Risk " Calculator  BRCA-Related Cancer Risk Assessment: FHS-7 Tool  FRAX Risk Assessment  ICSI Preventive Guidelines  Dietary Guidelines for Americans, 2010  USDA's MyPlate  ASA Prophylaxis  Lung CA Screening    Mary Chapa MD  [facility]

## 2021-08-04 NOTE — PATIENT INSTRUCTIONS
Replens -- over the counter for vaginal dryness      Fish oil -- 1000-2000mg daily    The 10-year ASCVD risk score (Symone FORMAN Jr., et al., 2013) is: 4.7%    Values used to calculate the score:      Age: 60 years      Sex: Female      Is Non- : No      Diabetic: No      Tobacco smoker: No      Systolic Blood Pressure: 124 mmHg      Is BP treated: No      HDL Cholesterol: 57 mg/dL      Total Cholesterol: 283 mg/dL      Preventive Health Recommendations  Female Ages 50 - 64    Yearly exam: See your health care provider every year in order to  o Review health changes.   o Discuss preventive care.    o Review your medicines if your doctor has prescribed any.      Get a Pap test every three years (unless you have an abnormal result and your provider advises testing more often).    If you get Pap tests with HPV test, you only need to test every 5 years, unless you have an abnormal result.     You do not need a Pap test if your uterus was removed (hysterectomy) and you have not had cancer.    You should be tested each year for STDs (sexually transmitted diseases) if you're at risk.     Have a mammogram every 1 to 2 years.    Have a colonoscopy at age 50, or have a yearly FIT test (stool test). These exams screen for colon cancer.      Have a cholesterol test every 5 years, or more often if advised.    Have a diabetes test (fasting glucose) every three years. If you are at risk for diabetes, you should have this test more often.     If you are at risk for osteoporosis (brittle bone disease), think about having a bone density scan (DEXA).    Shots: Get a flu shot each year. Get a tetanus shot every 10 years.    Nutrition:     Eat at least 5 servings of fruits and vegetables each day.    Eat whole-grain bread, whole-wheat pasta and brown rice instead of white grains and rice.    Get adequate Calcium and Vitamin D.     Lifestyle    Exercise at least 150 minutes a week (30 minutes a day, 5 days a week).  This will help you control your weight and prevent disease.    Limit alcohol to one drink per day.    No smoking.     Wear sunscreen to prevent skin cancer.     See your dentist every six months for an exam and cleaning.    See your eye doctor every 1 to 2 years.

## 2021-08-06 NOTE — CONSULTS
SUBJECTIVE:  Alexey comes in for several lesions.  One is on the right side of the nose.  This has been a crusted and occasionally eroded  area for several months, maybe years.  She is concerned about it as she should be.  She states that that site was apparently treated by Dr. Orozco in the past, but has recurred.      She also has dermographism and a lesion on her right arm near the elbow that swells periodically, and it would appear that this is, perhaps, a solitary mast cell lesion.      Thirdly, on the instep of the left foot is a 2 mm pigmented lesion I checked with a dermatoscope and did not appear to be concerning.  The nasal lesion is concerning.  It shows a small erosion- ulceration surrounded by some irregularly shaped tumor tissue and scaling.   I suspect this is a basal cell carcinoma.        ASSESSMENT:  Probable basal cell carcinoma. Nevus foot benign. Possible benign mastocytoma arm.      PLAN:  Lesion on the nose was shave biopsied.  Will call with report.  Would like to set her up with Dr. Berger if she needs Mohs surgery.      MEDICATIONS AND ALLERGIES:  Reviewed.      Return pending biopsy report.         ELEUTERIO DUENAS MD             D: 2018 15:42   T: 2018 19:25   MT: DT      Name:     ALEXEY FARMER   MRN:      [id number]        Account:       [id number]   :      1960           Consult Date:  2018      Document: [id number]       Urology ([place])

## 2021-08-09 ENCOUNTER — OFFICE VISIT (OUTPATIENT)
Dept: FAMILY MEDICINE | Facility: OTHER | Age: 61
End: 2021-08-09
Attending: FAMILY MEDICINE
Payer: COMMERCIAL

## 2021-08-09 VITALS
WEIGHT: 190.4 LBS | HEIGHT: 60 IN | OXYGEN SATURATION: 98 % | TEMPERATURE: 97.6 F | SYSTOLIC BLOOD PRESSURE: 124 MMHG | HEART RATE: 62 BPM | RESPIRATION RATE: 18 BRPM | BODY MASS INDEX: 37.38 KG/M2 | DIASTOLIC BLOOD PRESSURE: 80 MMHG

## 2021-08-09 DIAGNOSIS — E78.5 HYPERLIPIDEMIA WITH TARGET LDL LESS THAN 130: ICD-10-CM

## 2021-08-09 DIAGNOSIS — E55.9 HYPOVITAMINOSIS D: ICD-10-CM

## 2021-08-09 DIAGNOSIS — E53.8 VITAMIN B12 DEFICIENCY (NON ANEMIC): ICD-10-CM

## 2021-08-09 DIAGNOSIS — Z23 NEED FOR PROPHYLACTIC VACCINATION AND INOCULATION AGAINST INFLUENZA: ICD-10-CM

## 2021-08-09 DIAGNOSIS — L30.8 OTHER ECZEMA: ICD-10-CM

## 2021-08-09 DIAGNOSIS — Z00.00 ROUTINE GENERAL MEDICAL EXAMINATION AT A HEALTH CARE FACILITY: Primary | ICD-10-CM

## 2021-08-09 DIAGNOSIS — R73.09 ELEVATED GLUCOSE: ICD-10-CM

## 2021-08-09 DIAGNOSIS — Z12.31 OTHER SCREENING MAMMOGRAM: ICD-10-CM

## 2021-08-09 LAB
ALBUMIN SERPL-MCNC: 3.4 G/DL (ref 3.4–5)
ALP SERPL-CCNC: 76 U/L (ref 40–150)
ALT SERPL W P-5'-P-CCNC: 19 U/L (ref 0–50)
ANION GAP SERPL CALCULATED.3IONS-SCNC: 5 MMOL/L (ref 3–14)
AST SERPL W P-5'-P-CCNC: 16 U/L (ref 0–45)
BILIRUB SERPL-MCNC: 0.4 MG/DL (ref 0.2–1.3)
BUN SERPL-MCNC: 14 MG/DL (ref 7–30)
CALCIUM SERPL-MCNC: 8.5 MG/DL (ref 8.5–10.1)
CHLORIDE BLD-SCNC: 109 MMOL/L (ref 94–109)
CHOLEST SERPL-MCNC: 283 MG/DL
CO2 SERPL-SCNC: 27 MMOL/L (ref 20–32)
CREAT SERPL-MCNC: 0.78 MG/DL (ref 0.52–1.04)
EST. AVERAGE GLUCOSE BLD GHB EST-MCNC: 111 MG/DL
FASTING STATUS PATIENT QL REPORTED: YES
GFR SERPL CREATININE-BSD FRML MDRD: 82 ML/MIN/1.73M2
GLUCOSE BLD-MCNC: 89 MG/DL (ref 70–99)
HBA1C MFR BLD: 5.5 % (ref 0–5.6)
HDLC SERPL-MCNC: 57 MG/DL
LDLC SERPL CALC-MCNC: 188 MG/DL
NONHDLC SERPL-MCNC: 226 MG/DL
POTASSIUM BLD-SCNC: 4.2 MMOL/L (ref 3.4–5.3)
PROT SERPL-MCNC: 6.9 G/DL (ref 6.8–8.8)
SODIUM SERPL-SCNC: 141 MMOL/L (ref 133–144)
TRIGL SERPL-MCNC: 189 MG/DL
VIT B12 SERPL-MCNC: 460 PG/ML (ref 193–986)

## 2021-08-09 PROCEDURE — 99213 OFFICE O/P EST LOW 20 MIN: CPT | Mod: 25 | Performed by: FAMILY MEDICINE

## 2021-08-09 PROCEDURE — 82607 VITAMIN B-12: CPT | Performed by: FAMILY MEDICINE

## 2021-08-09 PROCEDURE — 80061 LIPID PANEL: CPT | Performed by: FAMILY MEDICINE

## 2021-08-09 PROCEDURE — 90750 HZV VACC RECOMBINANT IM: CPT | Performed by: FAMILY MEDICINE

## 2021-08-09 PROCEDURE — 80053 COMPREHEN METABOLIC PANEL: CPT | Performed by: FAMILY MEDICINE

## 2021-08-09 PROCEDURE — 99396 PREV VISIT EST AGE 40-64: CPT | Mod: 25 | Performed by: FAMILY MEDICINE

## 2021-08-09 PROCEDURE — 83036 HEMOGLOBIN GLYCOSYLATED A1C: CPT | Performed by: FAMILY MEDICINE

## 2021-08-09 PROCEDURE — 36415 COLL VENOUS BLD VENIPUNCTURE: CPT | Performed by: FAMILY MEDICINE

## 2021-08-09 PROCEDURE — 90471 IMMUNIZATION ADMIN: CPT | Performed by: FAMILY MEDICINE

## 2021-08-09 RX ORDER — TRIAMCINOLONE ACETONIDE 1 MG/G
CREAM TOPICAL 2 TIMES DAILY PRN
Qty: 30 G | Refills: 0 | Status: SHIPPED | OUTPATIENT
Start: 2021-08-09

## 2021-08-09 SDOH — HEALTH STABILITY: PHYSICAL HEALTH: ON AVERAGE, HOW MANY DAYS PER WEEK DO YOU ENGAGE IN MODERATE TO STRENUOUS EXERCISE (LIKE A BRISK WALK)?: 5 DAYS

## 2021-08-09 SDOH — HEALTH STABILITY: PHYSICAL HEALTH: ON AVERAGE, HOW MANY MINUTES DO YOU ENGAGE IN EXERCISE AT THIS LEVEL?: 30 MIN

## 2021-08-09 ASSESSMENT — LIFESTYLE VARIABLES
HOW MANY STANDARD DRINKS CONTAINING ALCOHOL DO YOU HAVE ON A TYPICAL DAY: 1 OR 2
HOW OFTEN DO YOU HAVE A DRINK CONTAINING ALCOHOL: 2-3 TIMES A WEEK

## 2021-08-09 ASSESSMENT — PAIN SCALES - GENERAL: PAINLEVEL: NO PAIN (0)

## 2021-08-09 ASSESSMENT — MIFFLIN-ST. JEOR: SCORE: 1348.9

## 2021-08-09 NOTE — NURSING NOTE
"Chief Complaint   Patient presents with     Physical       Initial /80   Pulse 62   Temp 97.6  F (36.4  C)   Resp 18   Ht 1.53 m (5' 0.24\")   Wt 86.4 kg (190 lb 6.4 oz)   SpO2 98%   BMI 36.89 kg/m   Estimated body mass index is 36.89 kg/m  as calculated from the following:    Height as of this encounter: 1.53 m (5' 0.24\").    Weight as of this encounter: 86.4 kg (190 lb 6.4 oz).  Medication Reconciliation: ophelia Olsen  "

## 2021-08-12 ENCOUNTER — TELEPHONE (OUTPATIENT)
Dept: MAMMOGRAPHY | Facility: OTHER | Age: 61
End: 2021-08-12

## 2021-08-12 ENCOUNTER — ANCILLARY PROCEDURE (OUTPATIENT)
Dept: MAMMOGRAPHY | Facility: OTHER | Age: 61
End: 2021-08-12
Attending: FAMILY MEDICINE
Payer: COMMERCIAL

## 2021-08-12 DIAGNOSIS — Z12.31 OTHER SCREENING MAMMOGRAM: ICD-10-CM

## 2021-08-12 PROCEDURE — 77063 BREAST TOMOSYNTHESIS BI: CPT | Mod: TC | Performed by: RADIOLOGY

## 2021-08-12 PROCEDURE — 77067 SCR MAMMO BI INCL CAD: CPT | Mod: TC | Performed by: RADIOLOGY

## 2021-10-03 ENCOUNTER — HEALTH MAINTENANCE LETTER (OUTPATIENT)
Age: 61
End: 2021-10-03

## 2021-11-11 ENCOUNTER — ALLIED HEALTH/NURSE VISIT (OUTPATIENT)
Dept: FAMILY MEDICINE | Facility: OTHER | Age: 61
End: 2021-11-11
Attending: FAMILY MEDICINE
Payer: COMMERCIAL

## 2021-11-11 DIAGNOSIS — Z23 NEED FOR VACCINATION: Primary | ICD-10-CM

## 2021-11-11 PROCEDURE — 90471 IMMUNIZATION ADMIN: CPT

## 2021-11-11 PROCEDURE — 90750 HZV VACC RECOMBINANT IM: CPT

## 2022-04-14 NOTE — TELEPHONE ENCOUNTER
"Patient called stating she has a large bug bite on her right leg.  Patient states she has noticed it for about one week but bite has gotten bigger and looks \"puffy\" in the middle.  Patient states she also is experiencing chills, body aches, and sore throat.  Patient scheduled with Gale Snow in [facility] today at 10:45.     Reason for Disposition    [1] Red or very tender (to touch) area AND [2] getting larger over 48 hours after the bite    Additional Information    Negative: [1] Life-threatening reaction (anaphylaxis) in the past to same insect bite AND [2] < 2 hours since bite    Negative: Passed out (i.e., lost consciousness, collapsed and was not responding)    Negative: Difficulty breathing or wheezing    Negative: [1] Hoarseness or cough AND [2] sudden onset following bite    Negative: [1] Difficulty swallowing or slurred speech AND [2] sudden onset following bite    Negative: Sounds like a life-threatening emergency to the triager    Negative: Bee sting(s)    Negative: Spider bite(s)    Negative: Tick bite(s)    Negative: Mosquito bite(s)    Negative: Bed bug bite(s)    Negative: Boil suspected (i.e., painful red lump and NO insect bite)    Negative: Doesn't sound like an insect bite    Negative: Patient sounds very sick or weak to the triager    Negative: [1] SEVERE bite pain AND [2] not improved after 2 hours of pain medicine    Negative: [1] Fever AND [2] red area    Negative: [1] Fever AND [2] area is very tender to touch    Negative: [1] Red streak or red line AND [2] length > 2 inches (5 cm)    Negative: [1] Red or very tender (to touch) area AND [2] started over 24 hours after the bite    Answer Assessment - Initial Assessment Questions  1. TYPE of INSECT: \"What type of insect was it?\"       Patient unsure of what insect  2. ONSET: \"When did you get bitten?\"       Patient unsure of when she was bitten  3. LOCATION: \"Where is the insect bite located?\"       Right leg behind knee  4. REDNESS: \"Is the area " "red or pink?\" If so, ask \"What size is area of redness?\" (inches or cm). \"When did the redness start?\"      Red, 2 X 5 inches  5. PAIN: \"Is there any pain?\" If so, ask: \"How bad is it?\"  (Scale 1-10; or mild, moderate, severe)      Yes, \"feels like a burn\" mil  6. ITCHING: \"Does it itch?\" If so, ask: \"How bad is the itch?\"     - MILD: doesn't interfere with normal activities    - MODERATE-SEVERE: interferes with work, school, sleep, or other activities       no  7. SWELLING: \"How big is the swelling?\" (inches, cm, or compare to coins)      swollen  8. OTHER SYMPTOMS: \"Do you have any other symptoms?\"  (e.g., difficulty breathing, hives)      Whole body is achy, head ache, sore throat, dizzy  9. PREGNANCY: \"Is there any chance you are pregnant?\" \"When was your last menstrual period?\"      no    Protocols used: INSECT BITE-A-AH    " Cyclophosphamide Pregnancy And Lactation Text: This medication is Pregnancy Category D and it isn't considered safe during pregnancy. This medication is excreted in breast milk.

## 2022-08-04 ASSESSMENT — ENCOUNTER SYMPTOMS
HEMATOCHEZIA: 0
MYALGIAS: 0
HEMATURIA: 0
BREAST MASS: 0
FEVER: 0
CHILLS: 0
DIZZINESS: 0
SHORTNESS OF BREATH: 0
ARTHRALGIAS: 1
JOINT SWELLING: 0
HEARTBURN: 0
DYSURIA: 0
ABDOMINAL PAIN: 0
CONSTIPATION: 0
SORE THROAT: 0
NERVOUS/ANXIOUS: 0
FREQUENCY: 0
WEAKNESS: 0
EYE PAIN: 0
COUGH: 0
PALPITATIONS: 0
NAUSEA: 0
HEADACHES: 0
DIARRHEA: 0
PARESTHESIAS: 0

## 2022-08-10 ENCOUNTER — ANCILLARY PROCEDURE (OUTPATIENT)
Dept: GENERAL RADIOLOGY | Facility: OTHER | Age: 62
End: 2022-08-10
Attending: FAMILY MEDICINE
Payer: COMMERCIAL

## 2022-08-10 ENCOUNTER — APPOINTMENT (OUTPATIENT)
Dept: LAB | Facility: OTHER | Age: 62
End: 2022-08-10
Attending: FAMILY MEDICINE
Payer: COMMERCIAL

## 2022-08-10 ENCOUNTER — OFFICE VISIT (OUTPATIENT)
Dept: FAMILY MEDICINE | Facility: OTHER | Age: 62
End: 2022-08-10
Attending: FAMILY MEDICINE
Payer: COMMERCIAL

## 2022-08-10 VITALS
BODY MASS INDEX: 33.68 KG/M2 | OXYGEN SATURATION: 96 % | TEMPERATURE: 96.8 F | HEART RATE: 78 BPM | RESPIRATION RATE: 18 BRPM | SYSTOLIC BLOOD PRESSURE: 122 MMHG | WEIGHT: 183 LBS | DIASTOLIC BLOOD PRESSURE: 80 MMHG | HEIGHT: 62 IN

## 2022-08-10 DIAGNOSIS — E55.9 HYPOVITAMINOSIS D: ICD-10-CM

## 2022-08-10 DIAGNOSIS — Z00.00 ROUTINE GENERAL MEDICAL EXAMINATION AT A HEALTH CARE FACILITY: Primary | ICD-10-CM

## 2022-08-10 DIAGNOSIS — E53.8 VITAMIN B12 DEFICIENCY (NON ANEMIC): ICD-10-CM

## 2022-08-10 DIAGNOSIS — Z12.31 OTHER SCREENING MAMMOGRAM: ICD-10-CM

## 2022-08-10 DIAGNOSIS — M25.551 HIP PAIN, RIGHT: ICD-10-CM

## 2022-08-10 DIAGNOSIS — Z71.89 ADVANCED CARE PLANNING/COUNSELING DISCUSSION: ICD-10-CM

## 2022-08-10 DIAGNOSIS — E78.5 HYPERLIPIDEMIA WITH TARGET LDL LESS THAN 130: ICD-10-CM

## 2022-08-10 DIAGNOSIS — M79.675 PAIN OF TOE OF LEFT FOOT: ICD-10-CM

## 2022-08-10 DIAGNOSIS — N90.4 LICHEN SCLEROSUS OF FEMALE GENITALIA: ICD-10-CM

## 2022-08-10 DIAGNOSIS — R73.09 ELEVATED GLUCOSE: ICD-10-CM

## 2022-08-10 LAB
ALBUMIN SERPL-MCNC: 3.5 G/DL (ref 3.4–5)
ALP SERPL-CCNC: 75 U/L (ref 40–150)
ALT SERPL W P-5'-P-CCNC: 18 U/L (ref 0–50)
ANION GAP SERPL CALCULATED.3IONS-SCNC: 5 MMOL/L (ref 3–14)
AST SERPL W P-5'-P-CCNC: 15 U/L (ref 0–45)
BILIRUB SERPL-MCNC: 0.6 MG/DL (ref 0.2–1.3)
BUN SERPL-MCNC: 14 MG/DL (ref 7–30)
CALCIUM SERPL-MCNC: 8.8 MG/DL (ref 8.5–10.1)
CHLORIDE BLD-SCNC: 105 MMOL/L (ref 94–109)
CHOLEST SERPL-MCNC: 286 MG/DL
CO2 SERPL-SCNC: 28 MMOL/L (ref 20–32)
CREAT SERPL-MCNC: 0.7 MG/DL (ref 0.52–1.04)
EST. AVERAGE GLUCOSE BLD GHB EST-MCNC: 114 MG/DL
FASTING STATUS PATIENT QL REPORTED: YES
GFR SERPL CREATININE-BSD FRML MDRD: >90 ML/MIN/1.73M2
GLUCOSE BLD-MCNC: 101 MG/DL (ref 70–99)
HBA1C MFR BLD: 5.6 % (ref 0–5.6)
HDLC SERPL-MCNC: 62 MG/DL
LDLC SERPL CALC-MCNC: 192 MG/DL
NONHDLC SERPL-MCNC: 224 MG/DL
POTASSIUM BLD-SCNC: 4.4 MMOL/L (ref 3.4–5.3)
PROT SERPL-MCNC: 7.4 G/DL (ref 6.8–8.8)
SODIUM SERPL-SCNC: 138 MMOL/L (ref 133–144)
TRIGL SERPL-MCNC: 160 MG/DL
URATE SERPL-MCNC: 5 MG/DL (ref 2.6–6)
VIT B12 SERPL-MCNC: 506 PG/ML (ref 232–1245)

## 2022-08-10 PROCEDURE — 82607 VITAMIN B-12: CPT | Performed by: FAMILY MEDICINE

## 2022-08-10 PROCEDURE — 99396 PREV VISIT EST AGE 40-64: CPT | Performed by: FAMILY MEDICINE

## 2022-08-10 PROCEDURE — 72100 X-RAY EXAM L-S SPINE 2/3 VWS: CPT | Mod: TC | Performed by: RADIOLOGY

## 2022-08-10 PROCEDURE — 84550 ASSAY OF BLOOD/URIC ACID: CPT | Performed by: FAMILY MEDICINE

## 2022-08-10 PROCEDURE — 73502 X-RAY EXAM HIP UNI 2-3 VIEWS: CPT | Mod: TC | Performed by: RADIOLOGY

## 2022-08-10 PROCEDURE — 99213 OFFICE O/P EST LOW 20 MIN: CPT | Mod: 25 | Performed by: FAMILY MEDICINE

## 2022-08-10 PROCEDURE — 36415 COLL VENOUS BLD VENIPUNCTURE: CPT | Performed by: FAMILY MEDICINE

## 2022-08-10 PROCEDURE — 80053 COMPREHEN METABOLIC PANEL: CPT | Performed by: FAMILY MEDICINE

## 2022-08-10 PROCEDURE — 83036 HEMOGLOBIN GLYCOSYLATED A1C: CPT | Performed by: FAMILY MEDICINE

## 2022-08-10 PROCEDURE — 80061 LIPID PANEL: CPT | Performed by: FAMILY MEDICINE

## 2022-08-10 SDOH — HEALTH STABILITY: PHYSICAL HEALTH: ON AVERAGE, HOW MANY MINUTES DO YOU ENGAGE IN EXERCISE AT THIS LEVEL?: 30 MIN

## 2022-08-10 SDOH — HEALTH STABILITY: PHYSICAL HEALTH: ON AVERAGE, HOW MANY DAYS PER WEEK DO YOU ENGAGE IN MODERATE TO STRENUOUS EXERCISE (LIKE A BRISK WALK)?: 5 DAYS

## 2022-08-10 ASSESSMENT — LIFESTYLE VARIABLES
HOW OFTEN DO YOU HAVE SIX OR MORE DRINKS ON ONE OCCASION: NEVER
HOW MANY STANDARD DRINKS CONTAINING ALCOHOL DO YOU HAVE ON A TYPICAL DAY: 1 OR 2
SKIP TO QUESTIONS 9-10: 1
HOW OFTEN DO YOU HAVE A DRINK CONTAINING ALCOHOL: 2-3 TIMES A WEEK
AUDIT-C TOTAL SCORE: 3

## 2022-08-10 ASSESSMENT — PAIN SCALES - GENERAL: PAINLEVEL: NO PAIN (0)

## 2022-08-10 NOTE — PROGRESS NOTES
SUBJECTIVE:   CC: Sarah Espitia is an 61 year old woman who presents for preventive health visit.     Patient has been advised of split billing requirements and indicates understanding: Yes    Healthy Habits:     Getting at least 3 servings of Calcium per day:  Yes    Bi-annual eye exam:  Yes    Dental care twice a year:  Yes    Sleep apnea or symptoms of sleep apnea:  None    Diet:  Regular (no restrictions)    Frequency of exercise:  4-5 days/week    Duration of exercise:  15-30 minutes    Taking medications regularly:  Not Applicable    Medication side effects:  Not applicable    PHQ-2 Total Score: 0    Musculoskeletal problem/pain      Duration: decades    Description  Location: right hip    Intensity:  moderate    Accompanying signs and symptoms: radiation of pain to leg    History  Previous similar problem: YES- but worsening past few years  Previous evaluation:  none    Precipitating or alleviating factors:  Trauma or overuse: no   Aggravating factors include: walking and overuse    Therapies tried and outcome: rest/inactivity      Today's PHQ-2 Score:   PHQ-2 ( 1999 [employer]) 8/4/2022   Q1: Little interest or pleasure in doing things 0   Q2: Feeling down, depressed or hopeless 0   PHQ-2 Score 0   PHQ-2 Total Score (12-17 Years)- Positive if 3 or more points; Administer PHQ-A if positive -   Q1: Little interest or pleasure in doing things Not at all   Q2: Feeling down, depressed or hopeless Not at all   PHQ-2 Score 0       Abuse: Current or Past (Physical, Sexual or Emotional) - No  Do you feel safe in your environment? Yes        Social History     Tobacco Use     Smoking status: Former Smoker     Packs/day: 1.50     Years: 16.00     Pack years: 24.00     Types: Cigarettes     Smokeless tobacco: Never Used     Tobacco comment: quit in 1993   Substance Use Topics     Alcohol use: Yes     Comment: 2-3/wk     If you drink alcohol do you typically have >3 drinks per day or >7 drinks per week? No    Alcohol Use  2022   Prescreen: >3 drinks/day or >7 drinks/week? No   Prescreen: >3 drinks/day or >7 drinks/week? -       Reviewed orders with patient.  Reviewed health maintenance and updated orders accordingly - Yes  Lab work is in process  Labs reviewed in EPIC  BP Readings from Last 3 Encounters:   08/10/22 122/80   21 124/80   21 130/82    Wt Readings from Last 3 Encounters:   08/10/22 83 kg (183 lb)   21 86.4 kg (190 lb 6.4 oz)   21 83.9 kg (185 lb)                  Patient Active Problem List   Diagnosis     Advanced care planning/counseling discussion     Hyperlipidemia with target LDL less than 130     Hypovitaminosis D     History of colonic polyps     ACP (advance care planning)     Other closed intra-articular fracture of distal end of right radius, initial encounter     Elevated glucose     Vitamin B12 deficiency (non anemic)     Other eczema     Past Surgical History:   Procedure Laterality Date     Bilateral tubal ligation        SECTION  1995    pregnancy      SECTION  2000    pregnancy (provider: Helena Serna)      SECTION  1999    pregnancy       SECTION  1996    pregnancy     COLONOSCOPY N/A 2015    Procedure: COLONOSCOPY;  Surgeon: Mariola Olsen MD;  Location: [facility]     COLONOSCOPY N/A 2021    due in   Procedure: Colonoscopy with polypectomy;  Surgeon: Yasir Braun MD;  Location: [facility]     colonoscopy with polypectomy  2009    outcome: hemorrhoids, repeat 5 years (provider: Akin Hedrick)     meniscus repair  2012    left     ORTHOPEDIC SURGERY  10/2016    right meniscus repair       Social History     Tobacco Use     Smoking status: Former Smoker     Packs/day: 1.50     Years: 16.00     Pack years: 24.00     Types: Cigarettes     Smokeless tobacco: Never Used     Tobacco comment: quit in    Substance Use Topics     Alcohol use: Yes     Comment: 2-3/wk     Family History   Problem Relation Age of  Onset     Pancreatitis Father 91        blocked pancreatic duct     C.A.D. Mother         s/p stenting -- negative tobacco     Cerebrovascular Disease Mother 63        CVA     Lipids Mother         hyperlipidemia     Hypertension Mother      Diabetes Type 2  Mother      Cancer Sister 30        leukemia - in remission, had chemo     Family History Negative Brother      Family History Negative Brother      Family History Negative Brother      Family History Negative Brother      Diabetes Maternal Grandmother      Kidney Disease Maternal Grandmother      Obesity Maternal Grandmother      Cardiovascular Maternal Grandfather 60        AMI     Dementia Paternal Grandmother      Lung Cancer Paternal Grandfather         neg tobacco         Current Outpatient Medications   Medication Sig Dispense Refill     triamcinolone (KENALOG) 0.1 % external cream Apply topically 2 times daily as needed for irritation 30 g 0     No Known Allergies    Breast Cancer Screening:    Breast CA Risk Assessment (FHS-7) 8/5/2021   Do you have a family history of breast, colon, or ovarian cancer? No / Unknown       Mammogram Screening: Recommended mammography every 1-2 years with patient discussion and risk factor consideration  Pertinent mammograms are reviewed under the imaging tab.    History of abnormal Pap smear: NO - age 30-65 PAP every 5 years with negative HPV co-testing recommended  PAP / HPV Latest Ref Rng & Units 7/31/2020 8/22/2014   PAP (Historical) - NIL NIL   HPV16 NEG:Negative Negative -   HPV18 NEG:Negative Negative -   HRHPV NEG:Negative Negative -     Reviewed and updated as needed this visit by clinical staff   Tobacco   Meds   Med Hx  Surg Hx  Fam Hx            Reviewed and updated as needed this visit by Provider                   Past Medical History:   Diagnosis Date     Actinic keratosis 02/22/2013     BCC (basal cell carcinoma), face 2018    sees Dr Radha Berger -- [facility] -- [facility] professionals     Dyslipidemia  2013     Eczema 2011     GRISELDA (generalized anxiety disorder)      Hyperplastic colon polyp 2013     Tubular adenoma of colon 2021    repeat       Past Surgical History:   Procedure Laterality Date     Bilateral tubal ligation        SECTION  1995    pregnancy      SECTION  2000    pregnancy (provider: Helena Serna)      SECTION  1999    pregnancy       SECTION  1996    pregnancy     COLONOSCOPY N/A 2015    Procedure: COLONOSCOPY;  Surgeon: Mariola Olsen MD;  Location: [facility]     COLONOSCOPY N/A 2021    due in   Procedure: Colonoscopy with polypectomy;  Surgeon: Yasir Braun MD;  Location: [facility]     colonoscopy with polypectomy  2009    outcome: hemorrhoids, repeat 5 years (provider: Akin Hedrick)     meniscus repair  2012    left     ORTHOPEDIC SURGERY  10/2016    right meniscus repair     OB History    Para Term  AB Living   4 4 4 0 0 4   SAB IAB Ectopic Multiple Live Births   0 0 0 0 0      # Outcome Date GA Lbr Ben/2nd Weight Sex Delivery Anes PTL Lv   4 Term      -SEC      3 Term      -SEC      2 Term      -SEC      1 Term      -SEC         Obstetric Comments   Breast fed       Review of Systems  CONSTITUTIONAL: NEGATIVE for fever, chills, change in weight  INTEGUMENTARY/SKIN: NEGATIVE for worrisome rashes, moles or lesions  EYES: NEGATIVE for vision changes or irritation  ENT: NEGATIVE for ear, mouth and throat problems  RESP: NEGATIVE for significant cough or SOB  BREAST: NEGATIVE for masses, tenderness or discharge  CV: NEGATIVE for chest pain, palpitations or peripheral edema  GI: NEGATIVE for nausea, abdominal pain, heartburn, or change in bowel habits  : NEGATIVE for unusual urinary or vaginal symptoms. No vaginal bleeding.  MUSCULOSKELETAL: NEGATIVE for significant arthralgias or myalgia  NEURO: NEGATIVE for weakness, dizziness or  "paresthesias  PSYCHIATRIC: NEGATIVE for changes in mood or affect      OBJECTIVE:   /80   Pulse 78   Temp 96.8  F (36  C) (Tympanic)   Resp 18   Ht 1.568 m (5' 1.75\")   Wt 83 kg (183 lb)   SpO2 96%   BMI 33.74 kg/m    Physical Exam  GENERAL APPEARANCE: healthy, alert and no distress  EYES: Eyes grossly normal to inspection, PERRL and conjunctivae and sclerae normal  HENT: ear canals and TM's normal, nose and mouth without ulcers or lesions, oropharynx clear and oral mucous membranes moist  NECK: no adenopathy, no asymmetry, masses, or scars and thyroid normal to palpation  RESP: lungs clear to auscultation - no rales, rhonchi or wheezes  BREAST: normal without masses, tenderness or nipple discharge and no palpable axillary masses or adenopathy  CV: regular rate and rhythm, normal S1 S2, no S3 or S4, no murmur, click or rub, no peripheral edema and peripheral pulses strong  ABDOMEN: soft, nontender, no hepatosplenomegaly, no masses and bowel sounds normal   (female): normal female external genitalia, normal urethral meatus, vaginal mucosal atrophy noted, normal cervix, adnexae, and uterus without masses or abnormal discharge   (female): vaginal skin findings: whitish changes on labia, with erythematous background, has an ulcerated appearance to it, non-tender to touch  MS: no musculoskeletal defects are noted and gait is age appropriate without ataxia  SKIN: no suspicious lesions or rashes  NEURO: Normal strength and tone, sensory exam grossly normal, mentation intact and speech normal  PSYCH: mentation appears normal and affect normal/bright  Lumber/Thoracic Spine Exam: Tender:  Non-tender:  throughout  Range of Motion:  lumbar flexion  full, lumbar extension  full, left lateral lumbar bending  full, pain-free, right lateral lumbar bending  full, painful, left lateral lumbar rotation  full, pain-free, right lateral lumbar rotation  full, painful  Strength:  able to heel walk, able to toe " walk  Special tests:  negative straight leg raises, negative SI joint compression, negative right femoral stretch test    Hip Exam: Hip ROM full on left, Hip ROM decreased slightly on right, left greater trocanter tender, right greater trocanter tender      Diagnostic Test Results:  Labs reviewed in Epic  Results for orders placed or performed in visit on 08/10/22   Comprehensive metabolic panel     Status: None (Preliminary result)   Result Value Ref Range    Sodium 138 133 - 144 mmol/L    Potassium 4.4 3.4 - 5.3 mmol/L    Chloride 105 94 - 109 mmol/L    Carbon Dioxide (CO2)      Anion Gap      Urea Nitrogen      Creatinine      Calcium      Glucose      Alkaline Phosphatase      AST      ALT      Protein Total      Albumin      Bilirubin Total      GFR Estimate         ASSESSMENT/PLAN:   (Z00.00) Routine general medical examination at a health care facility  (primary encounter diagnosis)  Comment:   Plan: follow-up 1 year    (E78.5) Hyperlipidemia with target LDL less than 130  Comment:   Plan: Comprehensive metabolic panel, Lipid Profile         (Chol, Trig, HDL, LDL calc)        The 10-year ASCVD risk score (Symone FORMAN Jr., et al., 2013) is: 4.8%    Values used to calculate the score:      Age: 61 years      Sex: Female      Is Non- : No      Diabetic: No      Tobacco smoker: No      Systolic Blood Pressure: 122 mmHg      Is BP treated: No      HDL Cholesterol: 62 mg/dL      Total Cholesterol: 286 mg/dL    (Z71.89) Advanced care planning/counseling discussion  Comment:   Plan: not discussed today    (E55.9) Hypovitaminosis D  Comment:   Plan: stable    (E53.8) Vitamin B12 deficiency (non anemic)  Comment:   Plan: Vitamin B12        pending    (R73.09) Elevated glucose  Comment:   Plan: Hemoglobin A1c          (M79.675) Pain of toe of left foot  Comment:   Plan: Uric acid          (M25.551) Hip pain, right  Comment:   Plan: XR Hip Right 2-3 Views (Clinic Performed), XR         LUMBAR SPINE  "2/3 VIEWS (Clinic Performed),         Orthopedic  Referral        Xray wnl    (Z12.31) Other screening mammogram  Comment:   Plan: [facility] SCREENING DIGITAL BILATERAL ([place])          (N90.4) Lichen sclerosus of female genitalia  Comment:   Plan: Ob/Gyn Referral        Denies pruritis    COUNSELING:  Reviewed preventive health counseling, as reflected in patient instructions  Special attention given to:        Regular exercise       Healthy diet/nutrition       Vision screening       Hearing screening       Consider Hep C screening for all patients one time for ages 18-79 years       Advance Care Planning    Estimated body mass index is 33.74 kg/m  as calculated from the following:    Height as of this encounter: 1.568 m (5' 1.75\").    Weight as of this encounter: 83 kg (183 lb).    Weight management plan: Discussed healthy diet and exercise guidelines    She reports that she has quit smoking. Her smoking use included cigarettes. She has a 24.00 pack-year smoking history. She has never used smokeless tobacco.      Counseling Resources:  ATP IV Guidelines  Pooled Cohorts Equation Calculator  Breast Cancer Risk Calculator  BRCA-Related Cancer Risk Assessment: FHS-7 Tool  FRAX Risk Assessment  ICSI Preventive Guidelines  Dietary Guidelines for Americans, 2010  [employer]'s MyPlate  ASA Prophylaxis  Lung CA Screening    Mary Chapa MD  [facility] [place]  "

## 2022-08-10 NOTE — NURSING NOTE
"Chief Complaint   Patient presents with     Physical       Initial /80   Pulse 78   Temp 96.8  F (36  C) (Tympanic)   Resp 18   Ht 1.568 m (5' 1.75\")   Wt 83 kg (183 lb)   SpO2 96%   BMI 33.74 kg/m   Estimated body mass index is 33.74 kg/m  as calculated from the following:    Height as of this encounter: 1.568 m (5' 1.75\").    Weight as of this encounter: 83 kg (183 lb).  Medication Reconciliation: complete  Arely Vegas LPN    "

## 2022-08-15 ENCOUNTER — TELEPHONE (OUTPATIENT)
Dept: FAMILY MEDICINE | Facility: OTHER | Age: 62
End: 2022-08-15

## 2022-08-15 NOTE — TELEPHONE ENCOUNTER
Pt calling and needs the [facility] referral sent again.She called there and they do not have the referral.This for right hip.    Please send.    Reshma Anthony RN

## 2022-08-17 ENCOUNTER — OFFICE VISIT (OUTPATIENT)
Dept: OBGYN | Facility: OTHER | Age: 62
End: 2022-08-17
Attending: FAMILY MEDICINE
Payer: COMMERCIAL

## 2022-08-17 VITALS
BODY MASS INDEX: 33.63 KG/M2 | WEIGHT: 182.4 LBS | SYSTOLIC BLOOD PRESSURE: 112 MMHG | HEART RATE: 76 BPM | DIASTOLIC BLOOD PRESSURE: 78 MMHG

## 2022-08-17 DIAGNOSIS — L90.0 LICHEN SCLEROSUS: Primary | ICD-10-CM

## 2022-08-17 PROCEDURE — 88305 TISSUE EXAM BY PATHOLOGIST: CPT

## 2022-08-17 PROCEDURE — 88312 SPECIAL STAINS GROUP 1: CPT

## 2022-08-17 PROCEDURE — 99203 OFFICE O/P NEW LOW 30 MIN: CPT | Mod: 25 | Performed by: STUDENT IN AN ORGANIZED HEALTH CARE EDUCATION/TRAINING PROGRAM

## 2022-08-17 PROCEDURE — 11104 PUNCH BX SKIN SINGLE LESION: CPT | Performed by: STUDENT IN AN ORGANIZED HEALTH CARE EDUCATION/TRAINING PROGRAM

## 2022-08-17 NOTE — PROGRESS NOTES
Gynecology Office Visit    Chief Complaint: Concern for lichen sclerosus    HPI:    Sarah Espitia is a 61 year old , here for concern for possible lichen sclerosus. She notes she had her annual well woman exam and pelvic exam last week and her provider noticed changes across the vulva that are consistent with lichen sclerosus. Today we discussed lichen in detail and the workup/treatment and ongoing maintenance treatment. We also discussed that if left untreated, it can increase her risk of vulvar skin cancer over time.    She was consented today for a vulvar biopsy to confirm diagnosis. She notes she has not had any symptoms such as itching or pain. She denies any vaginal discharge and denies any postmenopausal bleeding.     OBHx  OB History    Para Term  AB Living   4 4 4 0 0 4   SAB IAB Ectopic Multiple Live Births   0 0 0 0 0      # Outcome Date GA Lbr Ben/2nd Weight Sex Delivery Anes PTL Lv   4 Term      -SEC      3 Term      -SEC      2 Term      -SEC      1 Term      -SEC         Obstetric Comments   Breast fed       GYN history:   No history of STIs   Last pap smear: NIL HPV negative, due next 2025, No history of abnormal pap smears  Menopause around age 50 years, denies hot flashes    Past medical history:  Past Medical History:   Diagnosis Date     Actinic keratosis 2013     BCC (basal cell carcinoma), face 2018    sees Dr Radha Berger -- [facility] -- derm professionals     Dyslipidemia 2013     Eczema 2011     GRISELDA (generalized anxiety disorder)      Hyperplastic colon polyp 2013     Posterior vitreous detachment, right 2022    [facility] -- [facility]     Tubular adenoma of colon 2021    repeat      Specifically denies VTE, DM, HTN or bleeding disorders    Past Surgical History:  Past Surgical History:   Procedure Laterality Date     Bilateral tubal ligation        SECTION  1995    pregnancy       SECTION  2000    pregnancy (provider: Helena Serna)      SECTION  1999    pregnancy       SECTION  1996    pregnancy     COLONOSCOPY N/A 2015    Procedure: COLONOSCOPY;  Surgeon: Mariola Olsen MD;  Location: [facility]     COLONOSCOPY N/A 2021    due in   Procedure: Colonoscopy with polypectomy;  Surgeon: Yasir Braun MD;  Location: [facility]     colonoscopy with polypectomy  2009    outcome: hemorrhoids, repeat 5 years (provider: Akin Hedrick)     meniscus repair Left 2012    left     ORTHOPEDIC SURGERY Right 10/2016    right meniscus repair       Medications:  Current Outpatient Medications   Medication     triamcinolone (KENALOG) 0.1 % external cream     No current facility-administered medications for this visit.       Allergies:     No Known Allergies      Social History:  Social History     Tobacco Use     Smoking status: Former Smoker     Packs/day: 1.50     Years: 16.00     Pack years: 24.00     Types: Cigarettes     Quit date: 8/3/1993     Years since quittin.0     Smokeless tobacco: Never Used     Tobacco comment: quit in    Vaping Use     Vaping Use: Never used   Substance Use Topics     Alcohol use: Yes     Comment: 2-3/wk     Drug use: No       Family History:  Family History   Problem Relation Age of Onset     Pancreatitis Father 91        blocked pancreatic duct     C.A.D. Mother         s/p stenting -- negative tobacco     Cerebrovascular Disease Mother 63        CVA     Lipids Mother         hyperlipidemia     Hypertension Mother      Diabetes Type 2  Mother      Cancer Sister 30        leukemia - in remission, had chemo     Family History Negative Brother      Family History Negative Brother      Family History Negative Brother      Family History Negative Brother      Diabetes Maternal Grandmother      Kidney Disease Maternal Grandmother      Obesity Maternal Grandmother      Cardiovascular Maternal Grandfather 60         AMI     Dementia Paternal Grandmother      Lung Cancer Paternal Grandfather         neg tobacco       ROS:   Respiratory: No shortness of breath, dyspnea on exertion, cough, or hemoptysis  Cardiovascular: negative for palpitations, chest pain, lower extremity edema and syncope or near-syncope  Gastrointestinal: negative for, nausea, vomiting and hematemesis  Genitourinary: negative for, dysuria, frequency and urgency  Musculoskeletal: negative for, back pain and muscular weakness  Psychiatric: negative for, anxiety, depression and hallucinations  Hematologic/Lymphatic/Immunologic: negative for, anemia, chills and fever      Physical Exam  /78   Pulse 76   Wt 82.7 kg (182 lb 6.4 oz)   BMI 33.63 kg/m    Gen: Well-appearing, no acute distressed, well-groomed, alert  HEENT: Normocephalic, atraumatic  Cardiovascular: Regular rate, No peripheral edema, normal peripheral circulation  Pulm: Symmetric chest rise, non-labored respirations  Abd: Soft, non-tender, non-distended  Ext: No LE edema, extremities warm and well perfused  Pelvic:  The external genitalia shows signs of mild atrophy, agglutination of the labia majora bilaterally, thin, whitish tint to the labia noted. No sign of ulceration or abnormal vascularity. Normal hair distribution.    Vulvar Biopsy:  We discussed risks and benefits of the vulvar biopsy procedure. She signed consents and we helped her into a lithotomy position. The region was inspected and cleaned with betadine swabs. Lidocaine was injected under the lesion to provide local anesthesia. A 4mm punch biopsy was used to encompass the most white part of the left labia majora. The punch biopsy sample was put in a specimen cup and labeled to be sent to pathology for review.  A silver nitrate stick was used to provide hemostasis.      Assessment/Plan  Sarah Espitia is a 61 year old  female here for discussion and biopsy of lichen sclerosus.    #Lichen sclerosis  -- Vulvar biopsy  performed today  -- If biopsy confirmed, will prescribe clobetasol 0.05% cream karla use nightly for 4 weeks, every other night for 4 weeks and then twice/week for 4 weeks.   -- Follow up in 4-6 weeks to assess improvement  -- After 12 week treatment course, will need maintenance therapy with twice weekly mometasone furoate 0.1% ointment    Lichen sclerosis is a disease that is characterized by changes to the skin usually involving the vulva and occasionally around the anus. It can cause structural skin changes including tissue fusion of the labia majora and minora, tightening and change in caliber of the vaginal introitus. It usually presents with significant vaginal pruritis and occasionally excoriations from scratching the itchy region. Lichen Sclerosis can progress into vaginal intraepithelial neoplasia (GALE) precancerous lesions and even squamous cell carcinoma if left untreated and unmonitored. The mainstay of treatment is topical steroid ointment or cream. Discussed that the steroid cream is to be applied in a tapered schedule over 12 weeks as follows:    Clobetasol 0.05% cream or ointment  Nightly for 4 weeks  Every other night for 4 weeks  Twice each week for 4 weeks    Symptom improvement should occur by weeks 4-6. If no improvement of symptoms occurs, a biopsy is warranted to ensure it is not another dermatologic change disguised as LS.    Maintenance therapy will be needed throughout life. Maintenance is recommended to prevent progression of disease and any GALE or SCC changes.  Maintenance with Twice weekly Mometasone Furoate 0.1%  Follow up needed every 12 months once we reach to maintenance therapy stage.       Total amount of time spent during today's encounter including chart prep, face to face, documentation was 35 minutes. 5 minutes in addition were spent with the procedure    ALEXIS DOZIER MD on 8/17/2022 at 11:39 AM

## 2022-08-19 LAB
PATH REPORT.COMMENTS IMP SPEC: NORMAL
PATH REPORT.FINAL DX SPEC: NORMAL
PHOTO IMAGE: NORMAL

## 2022-08-23 DIAGNOSIS — L90.0 LICHEN SCLEROSUS: Primary | ICD-10-CM

## 2022-08-23 RX ORDER — CLOBETASOL PROPIONATE 0.5 MG/G
CREAM TOPICAL DAILY
Qty: 45 G | Refills: 0 | Status: SHIPPED | OUTPATIENT
Start: 2022-08-23 | End: 2023-09-05

## 2022-09-04 ENCOUNTER — HEALTH MAINTENANCE LETTER (OUTPATIENT)
Age: 62
End: 2022-09-04

## 2022-09-09 ENCOUNTER — TRANSFERRED RECORDS (OUTPATIENT)
Dept: HEALTH INFORMATION MANAGEMENT | Facility: CLINIC | Age: 62
End: 2022-09-09

## 2023-04-26 ENCOUNTER — TELEPHONE (OUTPATIENT)
Dept: FAMILY MEDICINE | Facility: OTHER | Age: 63
End: 2023-04-26

## 2023-04-26 NOTE — TELEPHONE ENCOUNTER
Patient is taking a vacation in one month.  She is asking for something that will help her anxiety while flying and the drive across the country.  Please let her know if there is anything she can take for her anxiety.    Send to [employer]

## 2023-04-26 NOTE — TELEPHONE ENCOUNTER
Caller would like to discuss an/a  for . Writer advised caller of callback within 24-72 hours.    Patient Name: Coral Vaca  Caller Name: Patient  Name of Facility: n/a  Callback Number: [phone] or [phone]  Best Availability: as soon as possible  Can A Detailed Message Be left? yes  Fax Number: [phone]  Additional Info: Patient came in to see PCP and was given a letter however Patient address changed. Writer changed address and Patient needs a New Letter with the correct address on it W2 activity. Patient needs letter  as soon as possible. Can be faxed to fax number above.  Please call Patient when letter is ready.  Did you confirm the message with the caller?: yes    Thank you,  Parul Hernandez   Patient scheduled 4/27 3:15 for video visit with Dr. Chapa

## 2023-04-27 ENCOUNTER — VIRTUAL VISIT (OUTPATIENT)
Dept: FAMILY MEDICINE | Facility: OTHER | Age: 63
End: 2023-04-27
Attending: FAMILY MEDICINE
Payer: COMMERCIAL

## 2023-04-27 DIAGNOSIS — F40.241 FEAR OF HEIGHTS: ICD-10-CM

## 2023-04-27 DIAGNOSIS — F40.243 FEAR OF FLYING: Primary | ICD-10-CM

## 2023-04-27 PROCEDURE — 99214 OFFICE O/P EST MOD 30 MIN: CPT | Mod: VID | Performed by: FAMILY MEDICINE

## 2023-04-27 RX ORDER — LORAZEPAM 0.5 MG/1
.25-.5 TABLET ORAL EVERY 8 HOURS PRN
Qty: 10 TABLET | Refills: 0 | Status: SHIPPED | OUTPATIENT
Start: 2023-04-27 | End: 2023-08-11

## 2023-04-27 ASSESSMENT — ANXIETY QUESTIONNAIRES
GAD7 TOTAL SCORE: 0
4. TROUBLE RELAXING: NOT AT ALL
2. NOT BEING ABLE TO STOP OR CONTROL WORRYING: NOT AT ALL
6. BECOMING EASILY ANNOYED OR IRRITABLE: NOT AT ALL
7. FEELING AFRAID AS IF SOMETHING AWFUL MIGHT HAPPEN: NOT AT ALL
1. FEELING NERVOUS, ANXIOUS, OR ON EDGE: NOT AT ALL
7. FEELING AFRAID AS IF SOMETHING AWFUL MIGHT HAPPEN: NOT AT ALL
GAD7 TOTAL SCORE: 0
5. BEING SO RESTLESS THAT IT IS HARD TO SIT STILL: NOT AT ALL
GAD7 TOTAL SCORE: 0
3. WORRYING TOO MUCH ABOUT DIFFERENT THINGS: NOT AT ALL

## 2023-04-27 ASSESSMENT — PATIENT HEALTH QUESTIONNAIRE - PHQ9
SUM OF ALL RESPONSES TO PHQ QUESTIONS 1-9: 0
SUM OF ALL RESPONSES TO PHQ QUESTIONS 1-9: 0

## 2023-04-27 NOTE — PROGRESS NOTES
"Sarah is a 62 year old who is being evaluated via a billable video visit.      How would you like to obtain your AVS? MyChart  If the video visit is dropped, the invitation should be resent by: Text to cell phone: [phone]  Will anyone else be joining your video visit? No    Assessment & Plan     Fear of flying  Discussed options such as Ativan, hydroxyzine, BuSpar, SSRIs  She is hesitant as she does not like to take any medications but she is going to [facility] at the end of May and would like something to help her with the flight over and the boat ride [place] [facility]  Discussed also to reduce jet leg to take melatonin few days before a few hours earlier than usual bedtime to try and help transition to the time difference which is 8 hours different  - LORazepam (ATIVAN) 0.5 MG tablet; Take 0.5-1 tablets (0.25-0.5 mg) by mouth every 8 hours as needed for anxiety    Fear of heights    - LORazepam (ATIVAN) 0.5 MG tablet; Take 0.5-1 tablets (0.25-0.5 mg) by mouth every 8 hours as needed for anxiety    Provider  Link to [facility] Help Grid :[id number]}     BMI:   Estimated body mass index is 33.63 kg/m  as calculated from the following:    Height as of 8/10/22: 1.568 m (5' 1.75\").    Weight as of 8/17/22: 82.7 kg (182 lb 6.4 oz).   Weight management plan: Discussed healthy diet and exercise guidelines    Patient was agreeable to this plan and had no further questions.  There are no Patient Instructions on file for this visit.    No follow-ups on file.    Mary Chapa MD  [facility] - [place]    Subjective   Sarah is a 62 year old, presenting for the following health issues:  Anxiety         View : No data to display.              HPI   Anxiety concern  Going on a trip has concerns about the long flight and on the way back she is riding a bus that is going over cliffy areas.    Anxiety Follow-Up    How are you doing with your anxiety since your last visit? Worsened with upcoming trip    Are you having " other symptoms that might be associated with anxiety? No    Have you had a significant life event? No     Are you feeling depressed? No    Do you have any concerns with your use of alcohol or other drugs? No      How many servings of fruits and vegetables do you eat daily?  2-3    On average, how many sweetened beverages do you drink each day (Examples: soda, juice, sweet tea, etc.  Do NOT count diet or artificially sweetened beverages)?   1    How many days per week do you exercise enough to make your heart beat faster? 3 or less    How many minutes a day do you exercise enough to make your heart beat faster? 20 - 29    How many days per week do you miss taking your medication? 0       Social History     Tobacco Use     Smoking status: Former     Packs/day: 1.50     Years: 16.00     Pack years: 24.00     Types: Cigarettes     Quit date: 1993     Years since quittin.1     Smokeless tobacco: Never     Tobacco comments:     quit in    Vaping Use     Vaping status: Never Used   Substance Use Topics     Alcohol use: Yes     Comment: 2-3/wk     Drug use: No         2018    11:37 AM 2020     1:00 PM 2023     2:24 PM   GRISELDA-7 SCORE   Total Score 0 (minimal anxiety)  0 (minimal anxiety)   Total Score 0 0 0         2018    11:37 AM 2020     1:00 PM 2023     2:23 PM   PHQ   PHQ-9 Total Score 2 0 0   Q9: Thoughts of better off dead/self-harm past 2 weeks Not at all Not at all Not at all         2023     2:23 PM   Last PHQ-9   1.  Little interest or pleasure in doing things 0   2.  Feeling down, depressed, or hopeless 0   3.  Trouble falling or staying asleep, or sleeping too much 0   4.  Feeling tired or having little energy 0   5.  Poor appetite or overeating 0   6.  Feeling bad about yourself 0   7.  Trouble concentrating 0   8.  Moving slowly or restless 0   Q9: Thoughts of better off dead/self-harm past 2 weeks 0   PHQ-9 Total Score 0         2023     2:24 PM   GRISELDA-7     1. Feeling nervous, anxious, or on edge 0   2. Not being able to stop or control worrying 0   3. Worrying too much about different things 0   4. Trouble relaxing 0   5. Being so restless that it is hard to sit still 0   6. Becoming easily annoyed or irritable 0   7. Feeling afraid, as if something awful might happen 0   GRISELDA-7 Total Score 0           Review of Systems   Constitutional, HEENT, cardiovascular, pulmonary, gi and gu systems are negative, except as otherwise noted.      Objective           Vitals:  No vitals were obtained today due to virtual visit.    Physical Exam   GENERAL: Healthy, alert and no distress  EYES: Eyes grossly normal to inspection.  No discharge or erythema, or obvious scleral/conjunctival abnormalities.  RESP: No audible wheeze, cough, or visible cyanosis.  No visible retractions or increased work of breathing.    SKIN: Visible skin clear. No significant rash, abnormal pigmentation or lesions.  NEURO: Cranial nerves grossly intact.  Mentation and speech appropriate for age.  PSYCH: Mentation appears normal, affect normal/bright, judgement and insight intact, normal speech and appearance well-groomed.    No results found for any visits on 04/27/23.          Video-Visit Details    Type of service:  Video Visit     Originating Location (pt. Location): Home  Distant Location (provider location):  On-site  Platform used for Video Visit: Doximity    Answers for HPI/ROS submitted by the patient on 4/27/2023  PHQ9 TOTAL SCORE: 0  GRISELDA 7 TOTAL SCORE: 0

## 2023-08-04 ASSESSMENT — ENCOUNTER SYMPTOMS
JOINT SWELLING: 0
FREQUENCY: 0
WEAKNESS: 0
NERVOUS/ANXIOUS: 0
HEMATURIA: 0
CHILLS: 0
MYALGIAS: 0
DIARRHEA: 0
HEADACHES: 0
EYE PAIN: 0
SORE THROAT: 0
HEARTBURN: 0
SHORTNESS OF BREATH: 0
PALPITATIONS: 0
HEMATOCHEZIA: 0
CONSTIPATION: 0
NAUSEA: 0
FEVER: 0
ABDOMINAL PAIN: 0
COUGH: 0
BREAST MASS: 0
PARESTHESIAS: 0
DIZZINESS: 0
DYSURIA: 0
ARTHRALGIAS: 1

## 2023-08-09 PROBLEM — E66.811 OBESITY, CLASS I, BMI 30-34.9: Status: ACTIVE | Noted: 2018-02-01

## 2023-08-09 PROBLEM — E88.810 METABOLIC SYNDROME: Status: ACTIVE | Noted: 2018-02-01

## 2023-08-09 ASSESSMENT — ENCOUNTER SYMPTOMS
SORE THROAT: 0
DIARRHEA: 0
CHILLS: 0
JOINT SWELLING: 0
NERVOUS/ANXIOUS: 0
BREAST MASS: 0
MYALGIAS: 0
FEVER: 0
SHORTNESS OF BREATH: 0
DYSURIA: 0
PALPITATIONS: 0
HEMATURIA: 0
PARESTHESIAS: 0
CONSTIPATION: 0
FREQUENCY: 0
WEAKNESS: 0
COUGH: 0
ABDOMINAL PAIN: 0
HEADACHES: 0
EYE PAIN: 0
NAUSEA: 0
DIZZINESS: 0
HEARTBURN: 0
ARTHRALGIAS: 1
HEMATOCHEZIA: 0

## 2023-08-09 NOTE — PROGRESS NOTES
SUBJECTIVE:   CC: Sarah is an 62 year old who presents for preventive health visit.       8/11/2023    10:01 AM   Additional Questions   Roomed by Dewey Mohamud   Accompanied by None         8/11/2023    10:01 AM   Patient Reported Additional Medications   Patient reports taking the following new medications None       Healthy Habits:     Getting at least 3 servings of Calcium per day:  Yes    Bi-annual eye exam:  Yes    Dental care twice a year:  Yes    Sleep apnea or symptoms of sleep apnea:  None    Diet:  Regular (no restrictions)    Frequency of exercise:  4-5 days/week    Duration of exercise:  15-30 minutes    Taking medications regularly:  Yes    Medication side effects:  Not applicable    Additional concerns today:  No    Lichen sclerosus    Duration: one year  Description (location/character/radiation): vaginal changes  Intensity:  mild, had bx last year in [place]  Accompanying signs and symptoms: doesn't really bother her  History (similar episodes/previous evaluation): None  Precipitating or alleviating factors: post menopausal  Therapies tried and outcome: clobetasol     Pre-Diabetes Follow-up    How often are you checking your blood sugar? Not at all  What concerns do you have today about your pre-diabetes? None   Do you have any of these symptoms? (Select all that apply)  No numbness or tingling in feet.  No redness, sores or blisters on feet.  No complaints of excessive thirst.  No reports of blurry vision.  No significant changes to weight.      BP Readings from Last 2 Encounters:   08/11/23 119/79   08/17/22 112/78     Hemoglobin A1C (%)   Date Value   08/11/2023 5.6   08/10/2022 5.6   07/31/2020 5.8 (H)     LDL Cholesterol Calculated (mg/dL)   Date Value   08/11/2023 218 (H)   08/10/2022 192 (H)   07/31/2020 173 (H)   08/25/2014 171 (H)             Social History     Tobacco Use    Smoking status: Former     Packs/day: 1.50     Years: 16.00     Pack years: 24.00     Types: Cigarettes      Quit date: 1993     Years since quittin.4    Smokeless tobacco: Never    Tobacco comments:     quit in    Substance Use Topics    Alcohol use: Yes     Comment: 2-3/wk             2023     9:48 AM   Alcohol Use   Prescreen: >3 drinks/day or >7 drinks/week? No   Reviewed orders with patient.  Reviewed health maintenance and updated orders accordingly - Yes  Lab work is in process  Labs reviewed in EPIC  BP Readings from Last 3 Encounters:   23 119/79   22 112/78   08/10/22 122/80    Wt Readings from Last 3 Encounters:   23 80.9 kg (178 lb 4.8 oz)   22 82.7 kg (182 lb 6.4 oz)   08/10/22 83 kg (183 lb)                  Patient Active Problem List   Diagnosis    Advanced care planning/counseling discussion    Hyperlipidemia with target LDL less than 130    Hypovitaminosis D    History of colonic polyps    ACP (advance care planning)    Other closed intra-articular fracture of distal end of right radius, initial encounter    Elevated glucose    Vitamin B12 deficiency (non anemic)    Other eczema    Hip pain, right    Other screening mammogram    Lichen sclerosus of female genitalia    Fear of flying    Fear of heights    Obesity, Class I, BMI 30-34.9    Metabolic syndrome    Actinic keratosis    Insulin resistance syndrome     Past Surgical History:   Procedure Laterality Date    Bilateral tubal ligation       SECTION  1995    pregnancy     SECTION  2000    pregnancy (provider: Helena Serna)     SECTION  1999    pregnancy      SECTION  1996    pregnancy    COLONOSCOPY N/A 2015    Procedure: COLONOSCOPY;  Surgeon: Mariola Olsen MD;  Location: [facility]    COLONOSCOPY N/A 2021    due in   Procedure: Colonoscopy with polypectomy;  Surgeon: Yasir Braun MD;  Location: [facility]    colonoscopy with polypectomy  2009    outcome: hemorrhoids, repeat 5 years (provider: Akin Hedrick)    meniscus repair Left  2012    left    ORTHOPEDIC SURGERY Right 10/2016    right meniscus repair       Social History     Tobacco Use    Smoking status: Former     Packs/day: 1.50     Years: 16.00     Pack years: 24.00     Types: Cigarettes     Quit date: 1993     Years since quittin.4    Smokeless tobacco: Never    Tobacco comments:     quit in    Substance Use Topics    Alcohol use: Yes     Comment: 2-3/wk     Family History   Problem Relation Age of Onset    Pancreatitis Father 91        blocked pancreatic duct    C.A.D. Mother         s/p stenting -- negative tobacco    Cerebrovascular Disease Mother         CVA    Lipids Mother         hyperlipidemia    Hypertension Mother     Diabetes Type 2  Mother     Diabetes Mother     Cancer Sister 30        leukemia - in remission, had chemo    Family History Negative Brother     Family History Negative Brother     Family History Negative Brother     Family History Negative Brother     Diabetes Maternal Grandmother     Kidney Disease Maternal Grandmother     Obesity Maternal Grandmother     Cardiovascular Maternal Grandfather 60        AMI    Dementia Paternal Grandmother     Lung Cancer Paternal Grandfather         neg tobacco         Current Outpatient Medications   Medication Sig Dispense Refill    clobetasol (TEMOVATE) 0.05 % external cream Apply topically daily Daily for 4 weeks (weeks 1-4), every other day for 4 weeks (weeks 5-8), twice each week for 4 weeks (weeks 9-12) 45 g 0    LORazepam (ATIVAN) 0.5 MG tablet Take 0.5-1 tablets (0.25-0.5 mg) by mouth every 8 hours as needed for anxiety 10 tablet 0    triamcinolone (KENALOG) 0.1 % external cream Apply topically 2 times daily as needed for irritation 30 g 0     No Known Allergies    Breast Cancer Screenin/5/2021     5:20 PM   Breast CA Risk Assessment (FHS-7)   Do you have a family history of breast, colon, or ovarian cancer? No / Unknown       Mammogram Screening: Recommended mammography every 1-2 years  with patient discussion and risk factor consideration  Pertinent mammograms are reviewed under the imaging tab.    History of abnormal Pap smear: NO - age 30-65 PAP every 5 years with negative HPV co-testing recommended      Latest Ref Rng & Units 2020     2:09 PM 2014    12:00 AM   PAP / HPV   PAP (Historical)  NIL  NIL    HPV 16 DNA NEG^Negative Negative     HPV 18 DNA NEG^Negative Negative     Other HR HPV NEG^Negative Negative       Reviewed and updated as needed this visit by clinical staff   Tobacco  Allergies  Meds              Reviewed and updated as needed this visit by Provider                 Past Medical History:   Diagnosis Date    Actinic keratosis 2013    BCC (basal cell carcinoma), face 2018    sees Dr Radha Berger -- [place] [facility]    Dyslipidemia 2013    Eczema 2011    Fear of flying     Hyperplastic colon polyp 2013    Posterior vitreous detachment, right 2022    [facility] -- Martina Baileytabathaa    Tubular adenoma of colon 2021    repeat       Past Surgical History:   Procedure Laterality Date    Bilateral tubal ligation       SECTION  1995    pregnancy     SECTION  2000    pregnancy (provider: Helena Serna)     SECTION  1999    pregnancy      SECTION  1996    pregnancy    COLONOSCOPY N/A 2015    Procedure: COLONOSCOPY;  Surgeon: Mariola Olsen MD;  Location: [facility]    COLONOSCOPY N/A 2021    due in   Procedure: Colonoscopy with polypectomy;  Surgeon: Yasir Braun MD;  Location: [facility]    colonoscopy with polypectomy  2009    outcome: hemorrhoids, repeat 5 years (provider: Akin Hedrick)    meniscus repair Left 2012    left    ORTHOPEDIC SURGERY Right 10/2016    right meniscus repair     OB History    Para Term  AB Living   4 4 4 0 0 4   SAB IAB Ectopic Multiple Live Births   0 0 0 0 0      # Outcome Date GA Lbr Ben/2nd Weight Sex Delivery Anes  PTL Lv   4 Term      -SEC      3 Term      -SEC      2 Term      -SEC      1 Term      -SEC         Obstetric Comments   Breast fed       Review of Systems   Constitutional:  Negative for chills and fever.   HENT:  Negative for congestion, ear pain, hearing loss and sore throat.    Eyes:  Negative for pain and visual disturbance.   Respiratory:  Negative for cough and shortness of breath.    Cardiovascular:  Negative for chest pain, palpitations and peripheral edema.   Gastrointestinal:  Negative for abdominal pain, constipation, diarrhea, heartburn, hematochezia and nausea.   Breasts:  Negative for tenderness, breast mass and discharge.   Genitourinary:  Negative for dysuria, frequency, genital sores, hematuria, pelvic pain, urgency, vaginal bleeding and vaginal discharge.   Musculoskeletal:  Positive for arthralgias. Negative for joint swelling and myalgias.   Skin:  Negative for rash.   Neurological:  Negative for dizziness, weakness, headaches and paresthesias.   Psychiatric/Behavioral:  Negative for mood changes. The patient is not nervous/anxious.         OBJECTIVE:   /79 (BP Location: Left arm, Patient Position: Sitting, Cuff Size: Adult Regular)   Pulse 60   Temp 97.5  F (36.4  C) (Tympanic)   Wt 80.9 kg (178 lb 4.8 oz)   SpO2 98%   BMI 32.88 kg/m    Physical Exam  GENERAL: healthy, alert and no distress  EYES: Eyes grossly normal to inspection, PERRL and conjunctivae and sclerae normal  HENT: ear canals and TM's normal, nose and mouth without ulcers or lesions  NECK: no adenopathy, no asymmetry, masses, or scars and thyroid normal to palpation  RESP: lungs clear to auscultation - no rales, rhonchi or wheezes  BREAST: normal without masses, tenderness or nipple discharge and no palpable axillary masses or adenopathy  CV: regular rate and rhythm, normal S1 S2, no S3 or S4, no murmur, click or rub, no peripheral edema and peripheral pulses strong  ABDOMEN: soft,  nontender, no hepatosplenomegaly, no masses and bowel sounds normal   (female): normal urethral meatus , vaginal mucosa pink, moist, well rugated, vaginal mucosal atrophy, vaginal skin findings: white skin changes and erythema, and normal cervix, adnexae, and uterus without masses.  MS: no gross musculoskeletal defects noted, no edema  SKIN: no suspicious lesions or rashes  NEURO: Normal strength and tone, mentation intact and speech normal  PSYCH: mentation appears normal, affect normal/bright    Diagnostic Test Results:  Labs reviewed in Epic  Results for orders placed or performed in visit on 08/11/23   Comprehensive metabolic panel     Status: Normal   Result Value Ref Range    Sodium 140 136 - 145 mmol/L    Potassium 4.5 3.4 - 5.3 mmol/L    Chloride 104 98 - 107 mmol/L    Carbon Dioxide (CO2) 25 22 - 29 mmol/L    Anion Gap 11 7 - 15 mmol/L    Urea Nitrogen 14.3 8.0 - 23.0 mg/dL    Creatinine 0.79 0.51 - 0.95 mg/dL    Calcium 9.3 8.8 - 10.2 mg/dL    Glucose 96 70 - 99 mg/dL    Alkaline Phosphatase 71 35 - 104 U/L    AST 20 0 - 45 U/L    ALT 16 0 - 50 U/L    Protein Total 6.8 6.4 - 8.3 g/dL    Albumin 4.2 3.5 - 5.2 g/dL    Bilirubin Total 0.4 <=1.2 mg/dL    GFR Estimate 83 >60 mL/min/1.73m2   Lipid Profile (Chol, Trig, HDL, LDL calc)     Status: Abnormal   Result Value Ref Range    Cholesterol 306 (H) <200 mg/dL    Triglycerides 145 <150 mg/dL    Direct Measure HDL 59 >=50 mg/dL    LDL Cholesterol Calculated 218 (H) <=100 mg/dL    Non HDL Cholesterol 247 (H) <130 mg/dL    Narrative    Cholesterol  Desirable:  <200 mg/dL    Triglycerides  Normal:  Less than 150 mg/dL  Borderline High:  150-199 mg/dL  High:  200-499 mg/dL  Very High:  Greater than or equal to 500 mg/dL    Direct Measure HDL  Female:  Greater than or equal to 50 mg/dL   Male:  Greater than or equal to 40 mg/dL    LDL Cholesterol  Desirable:  <100mg/dL  Above Desirable:  100-129 mg/dL   Borderline High:  130-159 mg/dL   High:  160-189 mg/dL   Very  High:  >= 190 mg/dL    Non HDL Cholesterol  Desirable:  130 mg/dL  Above Desirable:  130-159 mg/dL  Borderline High:  160-189 mg/dL  High:  190-219 mg/dL  Very High:  Greater than or equal to 220 mg/dL   Hemoglobin A1c     Status: None   Result Value Ref Range    Estimated Average Glucose 114 mg/dL    Hemoglobin A1C 5.6 <5.7 %   Insulin level     Status: Normal   Result Value Ref Range    Insulin 7.4 2.6 - 24.9 uU/mL       ASSESSMENT/PLAN:   (Z00.00) Routine general medical examination at a health care facility  (primary encounter diagnosis)  Comment:   Plan: follow-up 1 year    (E78.5) Hyperlipidemia with target LDL less than 130  Comment:   Plan: Comprehensive metabolic panel, Lipid Profile         (Chol, Trig, HDL, LDL calc), Comprehensive         metabolic panel, Lipid Profile (Chol, Trig,         HDL, LDL calc)        The 10-year ASCVD risk score (Logan MATHEWS, et al., 2019) is: 5.4%    Values used to calculate the score:      Age: 62 years      Sex: Female      Is Non- : No      Diabetic: No      Tobacco smoker: No      Systolic Blood Pressure: 119 mmHg      Is BP treated: No      HDL Cholesterol: 59 mg/dL      Total Cholesterol: 306 mg/dL    (R73.09) Elevated glucose  Comment:   Plan: Hemoglobin A1c, Insulin level        borderline    (F40.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) Fear of heights  Comment: \  Plan: LORazepam (ATIVAN) 0.5 MG tablet        Helped her on her trip to [place] and going across bridges    (F40.243) Fear of flying  Comment:   Plan: LORazepam (ATIVAN) 0.5 MG tablet          (Z12.31) Other screening mammogram  Comment:   Plan: MA SCREENING DIGITAL BILATERAL ([place])          (L30.8) Other eczema  Comment:   Plan: stable    (N90.4) Lichen sclerosus of female genitalia  Comment:   Plan: follow-up 3 weeks and then will switch to maintenance per ob/gyn      COUNSELING:  Reviewed preventive health counseling, as reflected in patient instructions  Special attention given to:        Regular exercise        Healthy diet/nutrition       Vision screening       Hearing screening        She reports that she quit smoking about 30 years ago. Her smoking use included cigarettes. She has a 24.00 pack-year smoking history. She has never used smokeless tobacco.          Mary Chapa MD  [facility] [place]

## 2023-08-11 ENCOUNTER — OFFICE VISIT (OUTPATIENT)
Dept: FAMILY MEDICINE | Facility: OTHER | Age: 63
End: 2023-08-11
Attending: FAMILY MEDICINE
Payer: COMMERCIAL

## 2023-08-11 VITALS
DIASTOLIC BLOOD PRESSURE: 79 MMHG | TEMPERATURE: 97.5 F | BODY MASS INDEX: 32.88 KG/M2 | WEIGHT: 178.3 LBS | HEART RATE: 60 BPM | SYSTOLIC BLOOD PRESSURE: 119 MMHG | OXYGEN SATURATION: 98 %

## 2023-08-11 DIAGNOSIS — R73.09 ELEVATED GLUCOSE: ICD-10-CM

## 2023-08-11 DIAGNOSIS — E78.5 HYPERLIPIDEMIA WITH TARGET LDL LESS THAN 130: ICD-10-CM

## 2023-08-11 DIAGNOSIS — Z00.00 ROUTINE GENERAL MEDICAL EXAMINATION AT A HEALTH CARE FACILITY: Primary | ICD-10-CM

## 2023-08-11 DIAGNOSIS — Z12.31 OTHER SCREENING MAMMOGRAM: ICD-10-CM

## 2023-08-11 DIAGNOSIS — F40.243 FEAR OF FLYING: ICD-10-CM

## 2023-08-11 DIAGNOSIS — N90.4 LICHEN SCLEROSUS OF FEMALE GENITALIA: ICD-10-CM

## 2023-08-11 DIAGNOSIS — F40.241 FEAR OF HEIGHTS: ICD-10-CM

## 2023-08-11 DIAGNOSIS — L30.8 OTHER ECZEMA: ICD-10-CM

## 2023-08-11 PROBLEM — E88.810 INSULIN RESISTANCE SYNDROME: Status: ACTIVE | Noted: 2018-02-01

## 2023-08-11 LAB
ALBUMIN SERPL BCG-MCNC: 4.2 G/DL (ref 3.5–5.2)
ALP SERPL-CCNC: 71 U/L (ref 35–104)
ALT SERPL W P-5'-P-CCNC: 16 U/L (ref 0–50)
ANION GAP SERPL CALCULATED.3IONS-SCNC: 11 MMOL/L (ref 7–15)
AST SERPL W P-5'-P-CCNC: 20 U/L (ref 0–45)
BILIRUB SERPL-MCNC: 0.4 MG/DL
BUN SERPL-MCNC: 14.3 MG/DL (ref 8–23)
CALCIUM SERPL-MCNC: 9.3 MG/DL (ref 8.8–10.2)
CHLORIDE SERPL-SCNC: 104 MMOL/L (ref 98–107)
CHOLEST SERPL-MCNC: 306 MG/DL
CREAT SERPL-MCNC: 0.79 MG/DL (ref 0.51–0.95)
DEPRECATED HCO3 PLAS-SCNC: 25 MMOL/L (ref 22–29)
EST. AVERAGE GLUCOSE BLD GHB EST-MCNC: 114 MG/DL
GFR SERPL CREATININE-BSD FRML MDRD: 83 ML/MIN/1.73M2
GLUCOSE SERPL-MCNC: 96 MG/DL (ref 70–99)
HBA1C MFR BLD: 5.6 %
HDLC SERPL-MCNC: 59 MG/DL
INSULIN SERPL-ACNC: 7.4 UU/ML (ref 2.6–24.9)
LDLC SERPL CALC-MCNC: 218 MG/DL
NONHDLC SERPL-MCNC: 247 MG/DL
POTASSIUM SERPL-SCNC: 4.5 MMOL/L (ref 3.4–5.3)
PROT SERPL-MCNC: 6.8 G/DL (ref 6.4–8.3)
SODIUM SERPL-SCNC: 140 MMOL/L (ref 136–145)
TRIGL SERPL-MCNC: 145 MG/DL

## 2023-08-11 PROCEDURE — 80061 LIPID PANEL: CPT | Performed by: FAMILY MEDICINE

## 2023-08-11 PROCEDURE — 99214 OFFICE O/P EST MOD 30 MIN: CPT | Mod: 25 | Performed by: FAMILY MEDICINE

## 2023-08-11 PROCEDURE — 99396 PREV VISIT EST AGE 40-64: CPT | Performed by: FAMILY MEDICINE

## 2023-08-11 PROCEDURE — 36415 COLL VENOUS BLD VENIPUNCTURE: CPT | Performed by: FAMILY MEDICINE

## 2023-08-11 PROCEDURE — 83036 HEMOGLOBIN GLYCOSYLATED A1C: CPT | Performed by: FAMILY MEDICINE

## 2023-08-11 PROCEDURE — 83525 ASSAY OF INSULIN: CPT | Performed by: FAMILY MEDICINE

## 2023-08-11 PROCEDURE — 80053 COMPREHEN METABOLIC PANEL: CPT | Performed by: FAMILY MEDICINE

## 2023-08-11 RX ORDER — LORAZEPAM 0.5 MG/1
.25-.5 TABLET ORAL EVERY 8 HOURS PRN
Qty: 10 TABLET | Refills: 0 | Status: SHIPPED | OUTPATIENT
Start: 2023-08-11

## 2023-08-11 SDOH — HEALTH STABILITY: PHYSICAL HEALTH: ON AVERAGE, HOW MANY DAYS PER WEEK DO YOU ENGAGE IN MODERATE TO STRENUOUS EXERCISE (LIKE A BRISK WALK)?: 5 DAYS

## 2023-08-11 SDOH — HEALTH STABILITY: PHYSICAL HEALTH: ON AVERAGE, HOW MANY MINUTES DO YOU ENGAGE IN EXERCISE AT THIS LEVEL?: 40 MIN

## 2023-08-11 ASSESSMENT — LIFESTYLE VARIABLES
HOW OFTEN DO YOU HAVE SIX OR MORE DRINKS ON ONE OCCASION: NEVER
AUDIT-C TOTAL SCORE: 3
HOW MANY STANDARD DRINKS CONTAINING ALCOHOL DO YOU HAVE ON A TYPICAL DAY: 1 OR 2
HOW OFTEN DO YOU HAVE A DRINK CONTAINING ALCOHOL: 2-3 TIMES A WEEK
SKIP TO QUESTIONS 9-10: 1

## 2023-08-11 ASSESSMENT — PAIN SCALES - GENERAL: PAINLEVEL: NO PAIN (0)

## 2023-08-11 NOTE — PATIENT INSTRUCTIONS
The glucose revolution by Jodi Serrano (glucose goddess)    The obesity code Dr Luan Wisdom     The 10-year ASCVD risk score (Logan MATHEWS, et al., 2019) is: 5.4%    Values used to calculate the score:      Age: 62 years      Sex: Female      Is Non- : No      Diabetic: No      Tobacco smoker: No      Systolic Blood Pressure: 119 mmHg      Is BP treated: No      HDL Cholesterol: 59 mg/dL      Total Cholesterol: 306 mg/dL

## 2023-09-05 ENCOUNTER — OFFICE VISIT (OUTPATIENT)
Dept: FAMILY MEDICINE | Facility: OTHER | Age: 63
End: 2023-09-05
Attending: FAMILY MEDICINE
Payer: COMMERCIAL

## 2023-09-05 VITALS
SYSTOLIC BLOOD PRESSURE: 120 MMHG | TEMPERATURE: 97.9 F | WEIGHT: 178 LBS | RESPIRATION RATE: 17 BRPM | HEIGHT: 62 IN | OXYGEN SATURATION: 98 % | BODY MASS INDEX: 32.76 KG/M2 | DIASTOLIC BLOOD PRESSURE: 74 MMHG | HEART RATE: 67 BPM

## 2023-09-05 DIAGNOSIS — L90.0 LICHEN SCLEROSUS: ICD-10-CM

## 2023-09-05 PROCEDURE — 99213 OFFICE O/P EST LOW 20 MIN: CPT | Performed by: FAMILY MEDICINE

## 2023-09-05 RX ORDER — CLOBETASOL PROPIONATE 0.5 MG/G
CREAM TOPICAL 2 TIMES DAILY
Qty: 60 G | Refills: 1 | Status: SHIPPED | OUTPATIENT
Start: 2023-09-05 | End: 2023-09-19

## 2023-09-05 ASSESSMENT — PAIN SCALES - GENERAL: PAINLEVEL: NO PAIN (0)

## 2023-09-05 NOTE — PROGRESS NOTES
"  Assessment & Plan     Lichen sclerosus  Improved from last visit, still moderate amount around urethra, repeat for 2 wks bid and then 2x/wk  Follow-up if symptomatic  - clobetasol (TEMOVATE) 0.05 % external cream; Apply topically 2 times daily for 14 days And then 2x/week        Patient was agreeable to this plan and had no further questions.  There are no Patient Instructions on file for this visit.    No follow-ups on file.    Mary Chapa MD  [facility] - [place] Smith is a 62 year old, presenting for the following health issues:  Follow Up        9/5/2023    11:15 AM   Additional Questions   Roomed by rach elizabeth   Accompanied by isaías GARNER     Concern - lichen sclerosis   Onset: ongoing  Description: watch patchy scales on the vagina   Intensity: mild  Progression of Symptoms:  same  Accompanying Signs & Symptoms: none  Previous history of similar problem: yes  Precipitating factors:        Worsened by: none  Alleviating factors:        Improved by: none  Therapies tried and outcome: None      Review of Systems   Constitutional, HEENT, cardiovascular, pulmonary, gi and gu systems are negative, except as otherwise noted.      Objective    /74 (BP Location: Left arm, Patient Position: Sitting, Cuff Size: Adult Large)   Pulse 67   Temp 97.9  F (36.6  C) (Tympanic)   Resp 17   Ht 1.568 m (5' 1.75\")   Wt 80.7 kg (178 lb)   SpO2 98%   BMI 32.82 kg/m    Body mass index is 32.82 kg/m .  Physical Exam   GENERAL: healthy, alert and no distress   (female): normal female external genitalia, normal urethral meatus , vaginal mucosal atrophy, and vaginal skin findings: white skin changes around urethra and inner introitus  MS: no gross musculoskeletal defects noted, no edema  PSYCH: mentation appears normal, affect normal/bright    No results found for any visits on 09/05/23.              "

## 2023-10-12 ENCOUNTER — LAB (OUTPATIENT)
Dept: LAB | Facility: OTHER | Age: 63
End: 2023-10-12
Payer: COMMERCIAL

## 2023-10-12 DIAGNOSIS — E78.5 HYPERLIPIDEMIA WITH TARGET LDL LESS THAN 130: ICD-10-CM

## 2023-10-12 LAB
ALBUMIN SERPL BCG-MCNC: 4.1 G/DL (ref 3.5–5.2)
ALP SERPL-CCNC: 82 U/L (ref 35–104)
ALT SERPL W P-5'-P-CCNC: 15 U/L (ref 0–50)
ANION GAP SERPL CALCULATED.3IONS-SCNC: 9 MMOL/L (ref 7–15)
AST SERPL W P-5'-P-CCNC: 23 U/L (ref 0–45)
BILIRUB SERPL-MCNC: 0.3 MG/DL
BUN SERPL-MCNC: 14.6 MG/DL (ref 8–23)
CALCIUM SERPL-MCNC: 9.2 MG/DL (ref 8.8–10.2)
CHLORIDE SERPL-SCNC: 104 MMOL/L (ref 98–107)
CHOLEST SERPL-MCNC: 267 MG/DL
CREAT SERPL-MCNC: 0.75 MG/DL (ref 0.51–0.95)
DEPRECATED HCO3 PLAS-SCNC: 26 MMOL/L (ref 22–29)
EGFRCR SERPLBLD CKD-EPI 2021: 88 ML/MIN/1.73M2
GLUCOSE SERPL-MCNC: 100 MG/DL (ref 70–99)
HDLC SERPL-MCNC: 57 MG/DL
LDLC SERPL CALC-MCNC: 188 MG/DL
NONHDLC SERPL-MCNC: 210 MG/DL
POTASSIUM SERPL-SCNC: 4.2 MMOL/L (ref 3.4–5.3)
PROT SERPL-MCNC: 7 G/DL (ref 6.4–8.3)
SODIUM SERPL-SCNC: 139 MMOL/L (ref 135–145)
TRIGL SERPL-MCNC: 109 MG/DL

## 2023-10-12 PROCEDURE — 80061 LIPID PANEL: CPT

## 2023-10-12 PROCEDURE — 36415 COLL VENOUS BLD VENIPUNCTURE: CPT

## 2023-10-12 PROCEDURE — 80053 COMPREHEN METABOLIC PANEL: CPT

## 2023-12-10 ENCOUNTER — HEALTH MAINTENANCE LETTER (OUTPATIENT)
Age: 63
End: 2023-12-10

## 2024-02-22 ENCOUNTER — PATIENT OUTREACH (OUTPATIENT)
Dept: GASTROENTEROLOGY | Facility: CLINIC | Age: 64
End: 2024-02-22
Payer: COMMERCIAL

## 2024-05-01 NOTE — OR NURSING
Discharge teaching done with pt spouse Sridhar who verbalized understanding of discharge teaching.    Need case request please, thanks!

## 2024-08-10 SDOH — HEALTH STABILITY: PHYSICAL HEALTH: ON AVERAGE, HOW MANY MINUTES DO YOU ENGAGE IN EXERCISE AT THIS LEVEL?: 30 MIN

## 2024-08-10 SDOH — HEALTH STABILITY: PHYSICAL HEALTH: ON AVERAGE, HOW MANY DAYS PER WEEK DO YOU ENGAGE IN MODERATE TO STRENUOUS EXERCISE (LIKE A BRISK WALK)?: 4 DAYS

## 2024-08-10 ASSESSMENT — SOCIAL DETERMINANTS OF HEALTH (SDOH): HOW OFTEN DO YOU GET TOGETHER WITH FRIENDS OR RELATIVES?: ONCE A WEEK

## 2024-08-14 ENCOUNTER — LAB (OUTPATIENT)
Dept: LAB | Facility: OTHER | Age: 64
End: 2024-08-14
Attending: FAMILY MEDICINE
Payer: MEDICARE

## 2024-08-14 ENCOUNTER — OFFICE VISIT (OUTPATIENT)
Dept: FAMILY MEDICINE | Facility: OTHER | Age: 64
End: 2024-08-14
Attending: FAMILY MEDICINE
Payer: MEDICARE

## 2024-08-14 VITALS
DIASTOLIC BLOOD PRESSURE: 78 MMHG | HEART RATE: 60 BPM | TEMPERATURE: 96.9 F | WEIGHT: 174 LBS | BODY MASS INDEX: 32.02 KG/M2 | HEIGHT: 62 IN | OXYGEN SATURATION: 98 % | RESPIRATION RATE: 18 BRPM | SYSTOLIC BLOOD PRESSURE: 122 MMHG

## 2024-08-14 DIAGNOSIS — Z86.0100 HISTORY OF COLONIC POLYPS: ICD-10-CM

## 2024-08-14 DIAGNOSIS — E55.9 HYPOVITAMINOSIS D: ICD-10-CM

## 2024-08-14 DIAGNOSIS — N90.4 LICHEN SCLEROSUS OF FEMALE GENITALIA: ICD-10-CM

## 2024-08-14 DIAGNOSIS — E88.810 INSULIN RESISTANCE SYNDROME: ICD-10-CM

## 2024-08-14 DIAGNOSIS — Z78.0 POST-MENOPAUSAL: ICD-10-CM

## 2024-08-14 DIAGNOSIS — E78.5 HYPERLIPIDEMIA WITH TARGET LDL LESS THAN 130: ICD-10-CM

## 2024-08-14 DIAGNOSIS — E53.8 VITAMIN B12 DEFICIENCY (NON ANEMIC): ICD-10-CM

## 2024-08-14 DIAGNOSIS — L30.8 OTHER ECZEMA: ICD-10-CM

## 2024-08-14 DIAGNOSIS — L57.0 ACTINIC KERATOSIS: ICD-10-CM

## 2024-08-14 DIAGNOSIS — R73.09 ELEVATED GLUCOSE: ICD-10-CM

## 2024-08-14 DIAGNOSIS — Z00.00 ENCOUNTER FOR ANNUAL WELLNESS EXAM IN MEDICARE PATIENT: Primary | ICD-10-CM

## 2024-08-14 DIAGNOSIS — C44.310 BCC (BASAL CELL CARCINOMA), FACE: ICD-10-CM

## 2024-08-14 LAB
ALBUMIN SERPL BCG-MCNC: 3.9 G/DL (ref 3.5–5.2)
ALP SERPL-CCNC: 83 U/L (ref 40–150)
ALT SERPL W P-5'-P-CCNC: 16 U/L (ref 0–50)
ANION GAP SERPL CALCULATED.3IONS-SCNC: 10 MMOL/L (ref 7–15)
AST SERPL W P-5'-P-CCNC: 23 U/L (ref 0–45)
BILIRUB SERPL-MCNC: 0.4 MG/DL
BUN SERPL-MCNC: 20 MG/DL (ref 8–23)
CALCIUM SERPL-MCNC: 9 MG/DL (ref 8.8–10.4)
CHLORIDE SERPL-SCNC: 106 MMOL/L (ref 98–107)
CHOLEST SERPL-MCNC: 313 MG/DL
CREAT SERPL-MCNC: 0.76 MG/DL (ref 0.51–0.95)
EGFRCR SERPLBLD CKD-EPI 2021: 86 ML/MIN/1.73M2
EST. AVERAGE GLUCOSE BLD GHB EST-MCNC: 114 MG/DL
FASTING STATUS PATIENT QL REPORTED: YES
FASTING STATUS PATIENT QL REPORTED: YES
GLUCOSE SERPL-MCNC: 101 MG/DL (ref 70–99)
HBA1C MFR BLD: 5.6 %
HCO3 SERPL-SCNC: 25 MMOL/L (ref 22–29)
HDLC SERPL-MCNC: 60 MG/DL
LDLC SERPL CALC-MCNC: 230 MG/DL
NONHDLC SERPL-MCNC: 253 MG/DL
POTASSIUM SERPL-SCNC: 4.6 MMOL/L (ref 3.4–5.3)
PROT SERPL-MCNC: 7 G/DL (ref 6.4–8.3)
SODIUM SERPL-SCNC: 141 MMOL/L (ref 135–145)
TRIGL SERPL-MCNC: 115 MG/DL
VIT B12 SERPL-MCNC: 592 PG/ML (ref 232–1245)
VIT D+METAB SERPL-MCNC: 39 NG/ML (ref 20–50)

## 2024-08-14 PROCEDURE — G0438 PPPS, INITIAL VISIT: HCPCS | Performed by: FAMILY MEDICINE

## 2024-08-14 PROCEDURE — 80053 COMPREHEN METABOLIC PANEL: CPT | Mod: ZL

## 2024-08-14 PROCEDURE — 99215 OFFICE O/P EST HI 40 MIN: CPT | Mod: 25 | Performed by: FAMILY MEDICINE

## 2024-08-14 PROCEDURE — 82607 VITAMIN B-12: CPT | Mod: ZL

## 2024-08-14 PROCEDURE — 80061 LIPID PANEL: CPT | Mod: ZL

## 2024-08-14 PROCEDURE — 83036 HEMOGLOBIN GLYCOSYLATED A1C: CPT | Mod: ZL

## 2024-08-14 PROCEDURE — G0463 HOSPITAL OUTPT CLINIC VISIT: HCPCS

## 2024-08-14 PROCEDURE — 82306 VITAMIN D 25 HYDROXY: CPT | Mod: ZL

## 2024-08-14 PROCEDURE — 36415 COLL VENOUS BLD VENIPUNCTURE: CPT | Mod: ZL

## 2024-08-14 SDOH — HEALTH STABILITY: PHYSICAL HEALTH: ON AVERAGE, HOW MANY DAYS PER WEEK DO YOU ENGAGE IN MODERATE TO STRENUOUS EXERCISE (LIKE A BRISK WALK)?: 6 DAYS

## 2024-08-14 SDOH — HEALTH STABILITY: PHYSICAL HEALTH: ON AVERAGE, HOW MANY MINUTES DO YOU ENGAGE IN EXERCISE AT THIS LEVEL?: 30 MIN

## 2024-08-14 ASSESSMENT — LIFESTYLE VARIABLES
HOW OFTEN DO YOU HAVE A DRINK CONTAINING ALCOHOL: MONTHLY OR LESS
AUDIT-C TOTAL SCORE: 1
HOW OFTEN DO YOU HAVE SIX OR MORE DRINKS ON ONE OCCASION: NEVER
HOW MANY STANDARD DRINKS CONTAINING ALCOHOL DO YOU HAVE ON A TYPICAL DAY: 1 OR 2
SKIP TO QUESTIONS 9-10: 1

## 2024-08-14 ASSESSMENT — PAIN SCALES - GENERAL: PAINLEVEL: NO PAIN (0)

## 2024-08-14 NOTE — PROGRESS NOTES
"Preventive Care Visit  [facility]  Mary Chapa MD, Family Medicine  Aug 14, 2024      Assessment & Plan     Encounter for annual wellness exam in Medicare patient  Follow-up 1 year    Vitamin B12 deficiency (non anemic)  Labs pending  - Vitamin B12; Future    Hypovitaminosis D  Labs pending  - Vitamin D Deficiency; Future    Insulin resistance syndrome  Discussed lifestyle changes     Hyperlipidemia with target LDL less than 130  The 10-year ASCVD risk score (Logan MATHEWS, et al., 2019) is: 6.2%    Values used to calculate the score:      Age: 63 years      Sex: Female      Is Non- : No      Diabetic: No      Tobacco smoker: No      Systolic Blood Pressure: 122 mmHg      Is BP treated: No      HDL Cholesterol: 60 mg/dL      Total Cholesterol: 313 mg/dL  Second to LDL> 190, would recommend medication which she declines  Will work on lifestyle changes and recheck labs in 2 mos  - Comprehensive metabolic panel; Future  - Lipid Profile (Chol, Trig, HDL, LDL calc); Future  - Lipid Profile (Chol, Trig, HDL, LDL calc); Future    Other eczema  stable    Actinic keratosis  No active lesions    Lichen sclerosus of female genitalia  stable    History of colonic polyps  Utd with colonoscopy    Elevated glucose  Reviewed A1c and changes she can work on  - Hemoglobin A1c; Future    Post-menopausal  due  - DX Bone Density; Future    BCC (basal cell carcinoma), face  Needs derm follow-up for annual skin checks, forgot to discuss with patient, will call before referral    Patient has been advised of split billing requirements and indicates understanding: Yes    BMI  Estimated body mass index is 32.08 kg/m  as calculated from the following:    Height as of this encounter: 1.568 m (5' 1.75\").    Weight as of this encounter: 78.9 kg (174 lb).   Weight management plan: Discussed healthy diet and exercise guidelines    Counseling  Appropriate preventive services were addressed with this patient via " screening, questionnaire, or discussion as appropriate for fall prevention, nutrition, physical activity, Tobacco-use cessation, weight loss and cognition.  Checklist reviewing preventive services available has been given to the patient.  Reviewed patient's diet, addressing concerns and/or questions.     50 minutes spent by me on the date of the encounter doing chart review, history and exam, documentation and further activities per the note, over 44 minutes spent in acute and chronic conditions and remaining time in HCM and exam.      Patient was agreeable to this plan and had no further questions.  Patient Instructions    Rosemary-inositol 500mg 2x/day for 1 week, then increase to 1000mg 2x/day for 1 week, then increase to 2000mg 2x/day  (ratio of rosemary to 'd' inositol is 40:1) You can take in capsule form or powder (powder can be placed in water and easily taken)  2.  Nordic naturals fish oil 2000mg daily    The 10-year ASCVD risk score (Logan MATHEWS, et al., 2019) is: 6.2%    Values used to calculate the score:      Age: 63 years      Sex: Female      Is Non- : No      Diabetic: No      Tobacco smoker: No      Systolic Blood Pressure: 122 mmHg      Is BP treated: No      HDL Cholesterol: 60 mg/dL      Total Cholesterol: 313 mg/dL  Results for orders placed or performed in visit on 08/14/24   Comprehensive metabolic panel     Status: Abnormal   Result Value Ref Range    Sodium 141 135 - 145 mmol/L    Potassium 4.6 3.4 - 5.3 mmol/L    Carbon Dioxide (CO2) 25 22 - 29 mmol/L    Anion Gap 10 7 - 15 mmol/L    Urea Nitrogen 20.0 8.0 - 23.0 mg/dL    Creatinine 0.76 0.51 - 0.95 mg/dL    GFR Estimate 86 >60 mL/min/1.73m2    Calcium 9.0 8.8 - 10.4 mg/dL    Chloride 106 98 - 107 mmol/L    Glucose 101 (H) 70 - 99 mg/dL    Alkaline Phosphatase 83 40 - 150 U/L    AST 23 0 - 45 U/L    ALT 16 0 - 50 U/L    Protein Total 7.0 6.4 - 8.3 g/dL    Albumin 3.9 3.5 - 5.2 g/dL    Bilirubin Total 0.4 <=1.2 mg/dL    Patient  Fasting > 8hrs? Yes    Lipid Profile (Chol, Trig, HDL, LDL calc)     Status: Abnormal   Result Value Ref Range    Cholesterol 313 (H) <200 mg/dL    Triglycerides 115 <150 mg/dL    Direct Measure HDL 60 >=50 mg/dL    LDL Cholesterol Calculated 230 (H) <=100 mg/dL    Non HDL Cholesterol 253 (H) <130 mg/dL    Patient Fasting > 8hrs? Yes     Narrative    Cholesterol  Desirable:  <200 mg/dL    Triglycerides  Normal:  Less than 150 mg/dL  Borderline High:  150-199 mg/dL  High:  200-499 mg/dL  Very High:  Greater than or equal to 500 mg/dL    Direct Measure HDL  Female:  Greater than or equal to 50 mg/dL   Male:  Greater than or equal to 40 mg/dL    LDL Cholesterol  Desirable:  <100mg/dL  Above Desirable:  100-129 mg/dL   Borderline High:  130-159 mg/dL   High:  160-189 mg/dL   Very High:  >= 190 mg/dL    Non HDL Cholesterol  Desirable:  130 mg/dL  Above Desirable:  130-159 mg/dL  Borderline High:  160-189 mg/dL  High:  190-219 mg/dL  Very High:  Greater than or equal to 220 mg/dL   Hemoglobin A1c     Status: None   Result Value Ref Range    Estimated Average Glucose 114 mg/dL    Hemoglobin A1C 5.6 <5.7 %           No follow-ups on file.    Wlima Smith is a 63 year old, presenting for the following:  Physical        8/14/2024     8:52 AM   Additional Questions   Roomed by April   Accompanied by self         8/14/2024     8:52 AM   Patient Reported Additional Medications   Patient reports taking the following new medications none        Health Care Directive  Patient does not have a Health Care Directive or Living Will: Discussed advance care planning with patient; however, patient declined at this time.    HPI  Musculoskeletal problem/pain    Duration: shoulder pain  Description  Location: bilateral  Intensity:  mild, moderate  Accompanying signs and symptoms: sharp in biceps, turning over in bed  History  Previous similar problem: YES- on the left  Previous evaluation:  chiropractor -- [place] for the  left  Precipitating or alleviating factors:  Trauma or overuse: no   Aggravating factors include: overuse  Therapies tried and outcome: stretching     Hyperlipidemia Follow-Up    Are you regularly taking any medication or supplement to lower your cholesterol?   No  Are you having muscle aches or other side effects that you think could be caused by your cholesterol lowering medication?  No        8/10/2024   General Health   How would you rate your overall physical health? Good   Feel stress (tense, anxious, or unable to sleep) Not at all            8/10/2024   Nutrition   Diet: Regular (no restrictions)            8/10/2024   Exercise   Days per week of moderate/strenous exercise 4 days   Average minutes spent exercising at this level 30 min            8/10/2024   Social Factors   Frequency of gathering with friends or relatives Once a week   Worry food won't last until get money to buy more No   Food not last or not have enough money for food? No   Do you have housing? (Housing is defined as stable permanent housing and does not include staying ouside in a car, in a tent, in an abandoned building, in an overnight shelter, or couch-surfing.) Yes   Are you worried about losing your housing? No   Lack of transportation? No   Unable to get utilities (heat,electricity)? No            8/10/2024   Fall Risk   Fallen 2 or more times in the past year? No    No   Trouble with walking or balance? No    No       Multiple values from one day are sorted in reverse-chronological order          8/10/2024   Activities of Daily Living- Home Safety   Needs help with the following daily activites None of the above   Safety concerns in the home None of the above            8/10/2024   Dental   Dentist two times every year? Yes            8/10/2024   Hearing Screening   Hearing concerns? None of the above            8/10/2024   Driving Risk Screening   Patient/family members have concerns about driving No            8/10/2024   General  Alertness/Fatigue Screening   Have you been more tired than usual lately? No            8/10/2024   Urinary Incontinence Screening   Bothered by leaking urine in past 6 months No            8/10/2024   TB Screening   Were you born outside of the [place]? No            Today's PHQ-2 Score:       2024     8:52 AM   PHQ-2 (  [employer])   Q1: Little interest or pleasure in doing things 0   Q2: Feeling down, depressed or hopeless 0   PHQ-2 Score 0   Q1: Little interest or pleasure in doing things Not at all   Q2: Feeling down, depressed or hopeless Not at all   PHQ-2 Score 0           8/10/2024   Substance Use   Alcohol more than 3/day or more than 7/wk No   Do you have a current opioid prescription? No   How severe/bad is pain from 1 to 10? 0/10 (No Pain)   Do you use any other substances recreationally? No        Social History     Tobacco Use    Smoking status: Former     Current packs/day: 0.00     Average packs/day: 1.5 packs/day for 16.0 years (24.0 ttl pk-yrs)     Types: Cigarettes     Start date: 1977     Quit date: 1993     Years since quittin.4     Passive exposure: Never    Smokeless tobacco: Never    Tobacco comments:     quit in    Vaping Use    Vaping status: Never Used   Substance Use Topics    Alcohol use: Yes     Comment: 2-3/wk    Drug use: No             8/10/2024   Breast Cancer Screening   Family history of breast, colon, or ovarian cancer? No / Unknown         Mammogram Screening - Mammogram every 1-2 years updated in Health Maintenance based on mutual decision making      History of abnormal Pap smear: No - age 65 or older with adequate negative prior screening test results (3 consecutive negative cytology results, 2 consecutive negative cotesting results, or 2 consecutive negative HrHPV test results within 10 years, with the most recent test occurring within the recommended screening interval for the test used)        Latest Ref Rng & Units 2020     2:09 PM 2014     12:00 AM   PAP / HPV   PAP (Historical)  NIL  NIL    HPV 16 DNA NEG^Negative Negative     HPV 18 DNA NEG^Negative Negative     Other HR HPV NEG^Negative Negative       ASCVD Risk   The 10-year ASCVD risk score (Logan MATHEWS, et al., 2019) is: 5.8%    Values used to calculate the score:      Age: 63 years      Sex: Female      Is Non- : No      Diabetic: No      Tobacco smoker: No      Systolic Blood Pressure: 122 mmHg      Is BP treated: No      HDL Cholesterol: 57 mg/dL      Total Cholesterol: 267 mg/dL    Fracture Risk Assessment Tool  Link to Frax Calculator  Use the information below to complete the Frax calculator  : 1960  Sex: female  Weight (kg): 78.9 kg (actual weight)  Height (cm): 156.8 cm  Previous Fragility Fracture:  Yes  History of parent with fractured hip:  No  Current Smoking:  No  Patient has been on glucocorticoids for more than 3 months (5mg/day or more): No  Rheumatoid Arthritis on Problem List:  No  Secondary Osteoporosis on Problem List:  No  Consumes 3 or more units of alcohol per day: No  Femoral Neck BMD (g/cm2)            Reviewed and updated as needed this visit by Provider   Tobacco     Med Hx  Surg Hx  Fam Hx  Soc Hx Sexual Activity          Past Medical History:   Diagnosis Date    Actinic keratosis 2013    BCC (basal cell carcinoma), face 2018    sees Dr Radha Berger -- [facility] -- [facility]    Dyslipidemia 2013    Eczema 2011    Elevated glucose     Fear of flying     Hyperlipidemia LDL goal <130     Hyperplastic colon polyp 2013    Obesity     Posterior vitreous detachment, right 2022    spider web -- Martina Gandara    Tubular adenoma of colon 2021    repeat      Past Surgical History:   Procedure Laterality Date    Bilateral tubal ligation       SECTION  1995    pregnancy     SECTION  2000    pregnancy (provider: Helena Serna)     SECTION  1999     pregnancy      SECTION  1996    pregnancy    COLONOSCOPY N/A 2015    Procedure: COLONOSCOPY;  Surgeon: Mariola Olsen MD;  Location: [facility]    COLONOSCOPY N/A 2021    due in   Procedure: Colonoscopy with polypectomy;  Surgeon: Yasir Braun MD;  Location: [facility]    colonoscopy with polypectomy  2009    outcome: hemorrhoids, repeat 5 years (provider: Akin Hedrick)    meniscus repair Left 2012    left    ORTHOPEDIC SURGERY Right 10/2016    right meniscus repair     OB History    Para Term  AB Living   4 4 4 0 0 4   SAB IAB Ectopic Multiple Live Births   0 0 0 0 0      # Outcome Date GA Lbr Ben/2nd Weight Sex Type Anes PTL Lv   4 Term      -SEC      3 Term      -SEC      2 Term      -SEC      1 Term      -SEC         Obstetric Comments   Breast fed     Lab work is in process  Labs reviewed in EPIC  BP Readings from Last 3 Encounters:   24 122/78   23 120/74   23 119/79    Wt Readings from Last 3 Encounters:   24 78.9 kg (174 lb)   23 80.7 kg (178 lb)   23 80.9 kg (178 lb 4.8 oz)                  Patient Active Problem List   Diagnosis    Advanced care planning/counseling discussion    Hyperlipidemia with target LDL less than 130    Hypovitaminosis D    History of colonic polyps    Other closed intra-articular fracture of distal end of right radius, initial encounter    Elevated glucose    Vitamin B12 deficiency (non anemic)    Other eczema    Hip pain, right    Other screening mammogram    Lichen sclerosus of female genitalia    Fear of flying    Fear of heights    Obesity, Class I, BMI 30-34.9    Metabolic syndrome    Actinic keratosis    Insulin resistance syndrome    BCC (basal cell carcinoma), face    Post-menopausal     Past Surgical History:   Procedure Laterality Date    Bilateral tubal ligation       SECTION  1995    pregnancy     SECTION  2000    pregnancy (provider:  Helena Serna)     SECTION  1999    pregnancy      SECTION  1996    pregnancy    COLONOSCOPY N/A 2015    Procedure: COLONOSCOPY;  Surgeon: Mariola Olsen MD;  Location: [facility]    COLONOSCOPY N/A 2021    due in   Procedure: Colonoscopy with polypectomy;  Surgeon: Yasir Braun MD;  Location: [facility]    colonoscopy with polypectomy  2009    outcome: hemorrhoids, repeat 5 years (provider: Akin Hedrick)    meniscus repair Left 2012    left    ORTHOPEDIC SURGERY Right 10/2016    right meniscus repair       Social History     Tobacco Use    Smoking status: Former     Current packs/day: 0.00     Average packs/day: 1.5 packs/day for 16.0 years (24.0 ttl pk-yrs)     Types: Cigarettes     Start date: 1977     Quit date: 1993     Years since quittin.4     Passive exposure: Never    Smokeless tobacco: Never    Tobacco comments:     quit in    Substance Use Topics    Alcohol use: Yes     Comment: rare     Family History   Problem Relation Age of Onset    C.A.D. Mother         s/p stenting -- negative tobacco    Cerebrovascular Disease Mother 63        CVA    Hypertension Mother     Diabetes Type 2  Mother     Hyperlipidemia Mother     Pancreatitis Father 91        blocked pancreatic duct --- age 95 in     Cancer Sister 30        leukemia - in remission, had chemo    Family History Negative Brother     Alcoholism Brother     Bladder Cancer Brother         +tobacco    Family History Negative Brother     Family History Negative Brother     Diabetes Maternal Grandmother     Kidney Disease Maternal Grandmother     Obesity Maternal Grandmother     Cardiovascular Maternal Grandfather 60        AMI    Dementia Paternal Grandmother     Lung Cancer Paternal Grandfather         neg tobacco         Current Outpatient Medications   Medication Sig Dispense Refill    LORazepam (ATIVAN) 0.5 MG tablet Take 0.5-1 tablets (0.25-0.5 mg) by mouth every 8 hours as needed for  anxiety 10 tablet 0    triamcinolone (KENALOG) 0.1 % external cream Apply topically 2 times daily as needed for irritation 30 g 0     No Known Allergies  Recent Labs   Lab Test 08/14/24  0849 10/12/23  1011 08/11/23  1014 08/10/22  0919 08/10/22  0919 08/09/21  1011 08/09/21  1011 07/31/20  1317   A1C  --   --  5.6  --  5.6  --  5.5 5.8*   * 188* 218*  --  192*   < > 188* 173*   HDL 60 57 59  --  62   < > 57 62   TRIG 115 109 145  --  160*   < > 189* 189*   ALT 16 15 16  --  18   < > 19 23   CR 0.76 0.75 0.79  --  0.70   < > 0.78 0.71   GFRESTIMATED 86 88 83  --  >90   < > 82 >90   GFRESTBLACK  --   --   --   --   --   --   --  >90   POTASSIUM 4.6 4.2 4.5   < > 4.4   < > 4.2 3.7   TSH  --   --   --   --   --   --   --  1.39    < > = values in this interval not displayed.      Current providers sharing in care for this patient include:  Patient Care Team:  Mary Chapa MD as PCP - General (Family Practice)  Mary Chapa MD as Assigned PCP    The following health maintenance items are reviewed in Epic and correct as of today:  Health Maintenance   Topic Date Due    DEXA  Never done    RSV VACCINE (Pregnancy & 60+) (1 - 1-dose 60+ series) Never done    MAMMO SCREENING  08/12/2023    COVID-19 Vaccine (4 - 2023-24 season) 09/01/2023    Pneumococcal Vaccine: 65+ Years (1 of 1 - PCV) Never done    INFLUENZA VACCINE (1) 09/01/2024    LIPID  10/12/2024    MEDICARE ANNUAL WELLNESS VISIT  08/14/2025    FALL RISK ASSESSMENT  08/14/2025    COLORECTAL CANCER SCREENING  03/18/2026    GLUCOSE  10/12/2026    ADVANCE CARE PLANNING  08/10/2027    DTAP/TDAP/TD IMMUNIZATION (3 - Td or Tdap) 07/31/2030    HEPATITIS C SCREENING  Completed    HIV SCREENING  Completed    PHQ-2 (once per calendar year)  Completed    ZOSTER IMMUNIZATION  Completed    IPV IMMUNIZATION  Aged Out    HPV IMMUNIZATION  Aged Out    MENINGITIS IMMUNIZATION  Aged Out    RSV MONOCLONAL ANTIBODY  Aged Out    PAP  Discontinued         Review of  "Systems  Constitutional, HEENT, cardiovascular, pulmonary, GI, , musculoskeletal, neuro, skin, endocrine and psych systems are negative, except as otherwise noted.     Objective    Exam  /78 (BP Location: Right arm, Patient Position: Sitting, Cuff Size: Adult Regular)   Pulse 60   Temp 96.9  F (36.1  C) (Tympanic)   Resp 18   Ht 1.568 m (5' 1.75\")   Wt 78.9 kg (174 lb)   SpO2 98%   BMI 32.08 kg/m     Estimated body mass index is 32.08 kg/m  as calculated from the following:    Height as of this encounter: 1.568 m (5' 1.75\").    Weight as of this encounter: 78.9 kg (174 lb).    Physical Exam  GENERAL: alert and no distress  EYES: Eyes grossly normal to inspection, PERRL and conjunctivae and sclerae normal  HENT: ear canals and TM's normal, nose and mouth without ulcers or lesions  NECK: no adenopathy, no asymmetry, masses, or scars  RESP: lungs clear to auscultation - no rales, rhonchi or wheezes  CV: regular rate and rhythm, normal S1 S2, no S3 or S4, no murmur, click or rub, no peripheral edema  ABDOMEN: soft, nontender, no hepatosplenomegaly, no masses and bowel sounds normal  MS: no gross musculoskeletal defects noted, no edema  SKIN: no suspicious lesions or rashes  NEURO: Normal strength and tone, mentation intact and speech normal  PSYCH: mentation appears normal, affect normal/bright        8/14/2024   Mini Cog   Clock Draw Score 2 Normal   3 Item Recall 3 objects recalled   Mini Cog Total Score 5            Vision Screen  Reason Vision Screen Not Completed: Parent/Patient declined - Had recent screening    Signed Electronically by: Mary Chapa MD    "

## 2024-08-14 NOTE — PATIENT INSTRUCTIONS
Rosemary-inositol 500mg 2x/day for 1 week, then increase to 1000mg 2x/day for 1 week, then increase to 2000mg 2x/day  (ratio of rosemary to 'd' inositol is 40:1) You can take in capsule form or powder (powder can be placed in water and easily taken)  2.  Nordic naturals fish oil 2000mg daily    The 10-year ASCVD risk score (Logan MATHEWS, et al., 2019) is: 6.2%    Values used to calculate the score:      Age: 63 years      Sex: Female      Is Non- : No      Diabetic: No      Tobacco smoker: No      Systolic Blood Pressure: 122 mmHg      Is BP treated: No      HDL Cholesterol: 60 mg/dL      Total Cholesterol: 313 mg/dL  Results for orders placed or performed in visit on 08/14/24   Comprehensive metabolic panel     Status: Abnormal   Result Value Ref Range    Sodium 141 135 - 145 mmol/L    Potassium 4.6 3.4 - 5.3 mmol/L    Carbon Dioxide (CO2) 25 22 - 29 mmol/L    Anion Gap 10 7 - 15 mmol/L    Urea Nitrogen 20.0 8.0 - 23.0 mg/dL    Creatinine 0.76 0.51 - 0.95 mg/dL    GFR Estimate 86 >60 mL/min/1.73m2    Calcium 9.0 8.8 - 10.4 mg/dL    Chloride 106 98 - 107 mmol/L    Glucose 101 (H) 70 - 99 mg/dL    Alkaline Phosphatase 83 40 - 150 U/L    AST 23 0 - 45 U/L    ALT 16 0 - 50 U/L    Protein Total 7.0 6.4 - 8.3 g/dL    Albumin 3.9 3.5 - 5.2 g/dL    Bilirubin Total 0.4 <=1.2 mg/dL    Patient Fasting > 8hrs? Yes    Lipid Profile (Chol, Trig, HDL, LDL calc)     Status: Abnormal   Result Value Ref Range    Cholesterol 313 (H) <200 mg/dL    Triglycerides 115 <150 mg/dL    Direct Measure HDL 60 >=50 mg/dL    LDL Cholesterol Calculated 230 (H) <=100 mg/dL    Non HDL Cholesterol 253 (H) <130 mg/dL    Patient Fasting > 8hrs? Yes     Narrative    Cholesterol  Desirable:  <200 mg/dL    Triglycerides  Normal:  Less than 150 mg/dL  Borderline High:  150-199 mg/dL  High:  200-499 mg/dL  Very High:  Greater than or equal to 500 mg/dL    Direct Measure HDL  Female:  Greater than or equal to 50 mg/dL   Male:  Greater than or  equal to 40 mg/dL    LDL Cholesterol  Desirable:  <100mg/dL  Above Desirable:  100-129 mg/dL   Borderline High:  130-159 mg/dL   High:  160-189 mg/dL   Very High:  >= 190 mg/dL    Non HDL Cholesterol  Desirable:  130 mg/dL  Above Desirable:  130-159 mg/dL  Borderline High:  160-189 mg/dL  High:  190-219 mg/dL  Very High:  Greater than or equal to 220 mg/dL   Hemoglobin A1c     Status: None   Result Value Ref Range    Estimated Average Glucose 114 mg/dL    Hemoglobin A1C 5.6 <5.7 %

## 2024-08-15 ENCOUNTER — HOSPITAL ENCOUNTER (OUTPATIENT)
Dept: BONE DENSITY | Facility: HOSPITAL | Age: 64
Discharge: HOME | End: 2024-08-15
Attending: FAMILY MEDICINE | Admitting: FAMILY MEDICINE
Payer: MEDICARE

## 2024-08-15 DIAGNOSIS — Z78.0 POST-MENOPAUSAL: ICD-10-CM

## 2024-08-15 PROCEDURE — 77080 DXA BONE DENSITY AXIAL: CPT

## 2024-10-09 ENCOUNTER — TELEPHONE (OUTPATIENT)
Dept: FAMILY MEDICINE | Facility: OTHER | Age: 64
End: 2024-10-09

## 2024-10-09 NOTE — TELEPHONE ENCOUNTER
Writer roel for patient requesting a call back to find out what the referral is for. Referral pended, just not sure of associated diagnosis. When forwarding to Dr Chapa please request that she add an A1C to patient labs at the end of the month.

## 2024-10-09 NOTE — TELEPHONE ENCOUNTER
RN ANKUSH talked with patient.  Patient requesting PT for bilateral shoulder pain and decreased range of motion.   Patient states this was discussed at [facility] on 8/14/24. Patient was seeing chiropractor prior to see if this would help.   Patient would like to see Becky Allen for PT and will call her office when she returns from vacation for fax number.   Patient also requesting to have repeat A1C drawn due to taking inositol.  Orders pended for approval.   Thank you.

## 2024-10-09 NOTE — TELEPHONE ENCOUNTER
11:41 AM    Reason for Call: Phone Call    Description: Patient is requesting a Physical Therapy referral in [place] and patient requesting A1C be added to her lab order for the end of the month.    Was an appointment offered for this call? No  If yes : Appointment type              Date    Preferred method for responding to this message: Telephone Call  What is your phone number ?  [phone]    If we cannot reach you directly, may we leave a detailed response at the number you provided? Yes    Can this message wait until your PCP/provider returns, if available today? Not applicable    Dhara Jesus

## 2024-10-09 NOTE — TELEPHONE ENCOUNTER
Would need to see her, might need xray before she can get PT  Insurance won't cover A1c but once a year b/c she doesn't have diabetes

## 2024-10-15 NOTE — TELEPHONE ENCOUNTER
Attempt # 1  Outcome: Left Message   Comment: LVM for patient to call to schedule an appt with Dr. Chapa (nurse was holding 3:15 spot for Friday 10/18).

## 2024-10-16 NOTE — TELEPHONE ENCOUNTER
Attempt # 2  Outcome: Left Message   Comment: LVM for patient to call to schedule an appt with Dr. Chapa (nurse was holding 3:15 spot for Friday 10/18).

## 2024-10-17 NOTE — TELEPHONE ENCOUNTER
Attempt # 3  Outcome: Left Message   Comment: LVM for patient to call to schedule with PCP regarding shoulder pain and possible need for x-ray to get PT.  Sending a letter.

## 2024-11-13 ENCOUNTER — TELEPHONE (OUTPATIENT)
Dept: FAMILY MEDICINE | Facility: OTHER | Age: 64
End: 2024-11-13

## 2024-11-13 NOTE — TELEPHONE ENCOUNTER
4:06 PM    Reason for Call: Phone Call    Description: pt called wants Dr. Chapa to put in a referral for her shoulders for Physical therapy and Pt wants to see Laura Ramesh in [place]     Was an appointment offered for this call? No  If yes : Appointment type              Date    Preferred method for responding to this message: Telephone Call  What is your phone number ? [phone]    If we cannot reach you directly, may we leave a detailed response at the number you provided? Yes    Can this message wait until your PCP/provider returns, if available today? YES    Jackelin Handy

## 2024-11-14 NOTE — TELEPHONE ENCOUNTER
Please see telephone encounter for 10/09/2024. Per PCP, patient needs an appt & possible X-ray before she can begin Physical Therapy. Please contact pt to schedule, thank you!

## 2024-11-15 ENCOUNTER — ANCILLARY PROCEDURE (OUTPATIENT)
Dept: GENERAL RADIOLOGY | Facility: OTHER | Age: 64
End: 2024-11-15
Attending: FAMILY MEDICINE
Payer: MEDICARE

## 2024-11-15 ENCOUNTER — OFFICE VISIT (OUTPATIENT)
Dept: FAMILY MEDICINE | Facility: OTHER | Age: 64
End: 2024-11-15
Attending: FAMILY MEDICINE
Payer: MEDICARE

## 2024-11-15 VITALS
HEIGHT: 62 IN | DIASTOLIC BLOOD PRESSURE: 80 MMHG | OXYGEN SATURATION: 98 % | HEART RATE: 79 BPM | WEIGHT: 175 LBS | BODY MASS INDEX: 32.2 KG/M2 | TEMPERATURE: 97 F | SYSTOLIC BLOOD PRESSURE: 144 MMHG

## 2024-11-15 DIAGNOSIS — G89.29 CHRONIC PAIN OF BOTH SHOULDERS: ICD-10-CM

## 2024-11-15 DIAGNOSIS — N90.4 LICHEN SCLEROSUS OF FEMALE GENITALIA: ICD-10-CM

## 2024-11-15 DIAGNOSIS — M25.512 CHRONIC PAIN OF BOTH SHOULDERS: ICD-10-CM

## 2024-11-15 DIAGNOSIS — R22.0 SWELLING OF UPPER LIP: ICD-10-CM

## 2024-11-15 DIAGNOSIS — F51.03 PARADOXICAL INSOMNIA: Primary | ICD-10-CM

## 2024-11-15 DIAGNOSIS — M25.511 CHRONIC PAIN OF BOTH SHOULDERS: ICD-10-CM

## 2024-11-15 DIAGNOSIS — M75.80 ROTATOR CUFF TENDINITIS, UNSPECIFIED LATERALITY: ICD-10-CM

## 2024-11-15 PROCEDURE — 36415 COLL VENOUS BLD VENIPUNCTURE: CPT | Mod: ZL | Performed by: FAMILY MEDICINE

## 2024-11-15 PROCEDURE — 73030 X-RAY EXAM OF SHOULDER: CPT | Mod: TC,RT

## 2024-11-15 PROCEDURE — G0463 HOSPITAL OUTPT CLINIC VISIT: HCPCS

## 2024-11-15 PROCEDURE — 73030 X-RAY EXAM OF SHOULDER: CPT | Mod: TC,LT

## 2024-11-15 RX ORDER — CLOBETASOL PROPIONATE 0.5 MG/G
OINTMENT TOPICAL WEEKLY
Qty: 60 G | Refills: 5 | Status: SHIPPED | OUTPATIENT
Start: 2024-11-15

## 2024-11-15 RX ORDER — TRAZODONE HYDROCHLORIDE 50 MG/1
25-75 TABLET, FILM COATED ORAL AT BEDTIME
Qty: 30 TABLET | Refills: 1 | Status: SHIPPED | OUTPATIENT
Start: 2024-11-15

## 2024-11-15 RX ORDER — CLOBETASOL PROPIONATE 0.5 MG/G
OINTMENT TOPICAL WEEKLY
COMMUNITY
End: 2024-11-15

## 2024-11-15 ASSESSMENT — PAIN SCALES - GENERAL: PAINLEVEL_OUTOF10: NO PAIN (0)

## 2024-11-15 NOTE — PROGRESS NOTES
The longitudinal plan of care for the diagnosis(es)/condition(s) as documented were addressed during this visit. Due to the added complexity in care, I will continue to support Sarah in the subsequent management and with ongoing continuity of care.    Assessment & Plan     Paradoxical insomnia  Trial magnesium and vitamin c as well  - traZODone (DESYREL) 50 MG tablet; Take 0.5-1.5 tablets (25-75 mg) by mouth at bedtime. Take 10 hours before you want to get up in the morning    Lichen sclerosus of female genitalia  refilled  - clobetasol (TEMOVATE) 0.05 % external ointment; Apply topically once a week.    Chronic pain of both shoulders  Xrays negative other than mild AC arthritis in right shoulder  - XR SHOULDER LEFT G/E 2 VIEWS (Clinic Performed)  - XR SHOULDER RIGHT G/E 2 VIEWS (Clinic Performed)  - Physical Therapy  Referral; Future    Rotator cuff tendinitis, unspecified laterality  Would like to see Laura Allen  - Physical Therapy  Referral; Future    Swelling of upper lip  Possible dairy allergy  - Allergy adult food panel; Future  - Allergy adult food panel    Patient was agreeable to this plan and had no further questions.  Patient Instructions    Magnesium glycinate 800mg 1 hr before bed  Vitamin C  1000mg during the day    No follow-ups on file.    33 minutes spent by me on the date of the encounter doing chart review, history and exam, documentation and further activities per the note      Subjective   Sarah is a 64 year old, presenting for the following health issues:  Shoulder Pain (bilateral)      11/15/2024     3:27 PM   Additional Questions   Roomed by Sudha dobson   Accompanied by self         11/15/2024   Declines Weight   Did patient decline having their weight taken? Yes          11/15/2024     3:27 PM   Patient Reported Additional Medications   Patient reports taking the following new medications None     Shoulder Pain    History of Present Illness       Reason for visit:   Shoulder pain  Symptom onset:  More than a month   She is taking medications regularly.       Pain History:  When did you first notice your pain? Left shoulder- 3 years   Have you seen this provider for your pain in the past? Yes   Where in your body do you have pain? Bilateral shoulders  Are you seeing anyone else for your pain? No    Chronic Pain Follow Up:    Location of pain: Shoulders  Analgesia/pain control:    - Recent changes:  Pain the same    - Overall control: Inadequate pain control    - Current treatments: none   Adherence:     - Do you ever take more pain medicine than prescribed? No    - When did you take your last dose of pain medicine?  N/a   Adverse effects: No   PDMP Review       None          Last CSA Agreement:   CSA -- Patient Level:    CSA: None found at the patient level.       Last UDS:   Insomnia  Onset/Duration: years  Description:   Frequency of insomnia:  occasionally  Time to fall asleep (sleep latency): 15 minutes  Middle of night awakening:  YES  Early morning awakening:  No  Progression of Symptoms:  waxing and waning  Accompanying Signs & Symptoms:  Daytime sleepiness/napping: No  Excessive snoring/apnea: No  Restless legs: No  Waking to urinate: No  Chronic pain:  YES- bilateral shoulder pain  Depression symptoms (if yes, do PHQ9): No  Anxiety symptoms (if yes, do GRISELDA-7): No  History:  Prior Insomnia: YES- mostly when away from home  New stressful situation: No  Precipitating factors:   Caffeine intake: No  OTC decongestants: No  Any new medications: No  Alleviating factors:  Self medicating (alcohol, etc.):  No  Stress-reduction (exercise, yoga, meditation etc): YES  Therapies tried and outcome: Benadryl -  usually effective      Review of Systems  Constitutional, neuro, ENT, endocrine, pulmonary, cardiac, gastrointestinal, genitourinary, musculoskeletal, integument and psychiatric systems are negative, except as otherwise noted.      Objective    BP (!) 144/80   Pulse 79   Temp  "97  F (36.1  C) (Tympanic)   Ht 1.568 m (5' 1.75\")   Wt 79.4 kg (175 lb)   SpO2 98%   BMI 32.27 kg/m    Body mass index is 32.27 kg/m .  Physical Exam   GENERAL: alert and no distress  RESP: lungs clear to auscultation - no rales, rhonchi or wheezes  CV: regular rate and rhythm, normal S1 S2, no S3 or S4, no murmur, click or rub, no peripheral edema  MS: normal range of motion  PSYCH: mentation appears normal, affect normal/bright  SHOULDER Exam-Bilateral   Inspection: no swelling, no bruising, no discoloration, no obvious deformity, no asymmetry, no glenohumeral joint anterior bulge, no distal clavicle elevation, no muscle atrophy, no scapular winging   Tenderness of: SC joint- no, clavicle(prox-mid)- no, clavicle-(mid-distal)- no, AC joint- no, acromion- no, anterior capsule- no, prox bicep tendon- no, greater tuberosity- no, prox humerus- no, supraspinatous- no, infraspinatous- no, superior trapezious- no, rhomboids- no   Range of Motion: Active- forward flexion- normal, abduction- normal, external rotation- normal, internal rotation- normal  Range of Motion: Passive- forward flexion- normal, abduction- normal, external rotation- normal, internal rotation- normal   Strength: forward flexion- 5/5, abduction- 5/5, internal rotation- 5/5, external rotation- 5/5 and bicep- full   Special tests: Neers- negative and Yeung(supraspinatous)- POSITIVE            Results for orders placed or performed in visit on 11/15/24   XR SHOULDER LEFT G/E 2 VIEWS (Clinic Performed)     Status: None    Narrative    PROCEDURE:  XR SHOULDER LEFT G/E 3 VIEWS    HISTORY: Chronic pain of both shoulders; Chronic pain of both  shoulders; Chronic pain of both shoulders    COMPARISON: Same day radiographs of the contralateral shoulder    TECHNIQUE:  XR SHOULDER LEFT 4 VIEWS    FINDINGS:    No acute fracture or dislocation is identified. No suspicious osseous  lesion. The joint spaces are preserved.     No foreign body or subcutaneous " emphysema.        Impression    IMPRESSION:    No acute osseous abnormality.    LOREN STEPHENS MD         SYSTEM ID:  [id number]   XR SHOULDER RIGHT G/E 2 VIEWS (Clinic Performed)     Status: None    Narrative    PROCEDURE:  XR SHOULDER RIGHT G/E 3 VIEWS    HISTORY: Chronic pain of both shoulders; Chronic pain of both  shoulders; Chronic pain of both shoulders    COMPARISON: Same day radiographs of the contralateral shoulder    TECHNIQUE:  XR SHOULDER RIGHT 4 VIEWS    FINDINGS:    No acute fracture or dislocation is identified. No suspicious osseous  lesion. The joint spaces are preserved. Mild degenerative changes of  the AC joint.    No foreign body or subcutaneous emphysema.        Impression    IMPRESSION:    No acute osseous abnormality.    LOREN STEPHENS MD         SYSTEM ID:  [id number]           Signed Electronically by: Mary Chapa MD

## 2024-11-20 LAB
ALMOND IGE QN: <0.1 KU(A)/L
CASHEW NUT IGE QN: 0.35 KU(A)/L
CODFISH IGE QN: <0.1 KU(A)/L
COW MILK IGE QN: 0.25 KU(A)/L
EGG WHITE IGE QN: 0.3 KU(A)/L
HAZELNUT IGE QN: <0.1 KU(A)/L
IGE SERPL-ACNC: 313 KU/L (ref 0–114)
PEANUT IGE QN: 0.11 KU(A)/L
SALMON IGE QN: <0.1 KU(A)/L
SCALLOP IGE QN: <0.1 KU(A)/L
SESAME SEED IGE QN: 0.16 KU(A)/L
SHRIMP IGE QN: 0.1 KU(A)/L
SOYBEAN IGE QN: <0.1 KU(A)/L
TUNA IGE QN: <0.1 KU(A)/L
WALNUT IGE QN: <0.1 KU(A)/L
WHEAT IGE QN: 0.11 KU(A)/L

## 2024-11-21 ENCOUNTER — LAB (OUTPATIENT)
Dept: LAB | Facility: OTHER | Age: 64
End: 2024-11-21
Payer: MEDICARE

## 2024-11-21 DIAGNOSIS — E78.5 HYPERLIPIDEMIA WITH TARGET LDL LESS THAN 130: ICD-10-CM

## 2024-11-21 LAB
CHOLEST SERPL-MCNC: 317 MG/DL
FASTING STATUS PATIENT QL REPORTED: YES
HDLC SERPL-MCNC: 61 MG/DL
LDLC SERPL CALC-MCNC: 230 MG/DL
NONHDLC SERPL-MCNC: 256 MG/DL
TRIGL SERPL-MCNC: 132 MG/DL

## 2024-11-21 PROCEDURE — 82465 ASSAY BLD/SERUM CHOLESTEROL: CPT | Mod: ZL

## 2024-11-21 PROCEDURE — 36415 COLL VENOUS BLD VENIPUNCTURE: CPT | Mod: ZL

## 2024-11-22 PROBLEM — Z91.018 MULTIPLE FOOD ALLERGIES: Status: ACTIVE | Noted: 2024-11-22

## 2024-11-27 ENCOUNTER — TELEPHONE (OUTPATIENT)
Dept: MAMMOGRAPHY | Facility: HOSPITAL | Age: 64
End: 2024-11-27

## 2024-11-27 ENCOUNTER — ANCILLARY PROCEDURE (OUTPATIENT)
Dept: MAMMOGRAPHY | Facility: OTHER | Age: 64
End: 2024-11-27
Attending: FAMILY MEDICINE
Payer: MEDICARE

## 2024-11-27 DIAGNOSIS — Z12.31 VISIT FOR SCREENING MAMMOGRAM: ICD-10-CM

## 2024-11-27 PROCEDURE — 77063 BREAST TOMOSYNTHESIS BI: CPT | Mod: TC

## 2025-05-08 ENCOUNTER — OFFICE VISIT (OUTPATIENT)
Dept: FAMILY MEDICINE | Facility: OTHER | Age: 65
End: 2025-05-08
Attending: STUDENT IN AN ORGANIZED HEALTH CARE EDUCATION/TRAINING PROGRAM
Payer: MEDICARE

## 2025-05-08 VITALS
RESPIRATION RATE: 16 BRPM | DIASTOLIC BLOOD PRESSURE: 74 MMHG | OXYGEN SATURATION: 95 % | SYSTOLIC BLOOD PRESSURE: 130 MMHG | TEMPERATURE: 97.5 F | HEART RATE: 76 BPM

## 2025-05-08 DIAGNOSIS — F40.243 FEAR OF FLYING: ICD-10-CM

## 2025-05-08 DIAGNOSIS — F40.241 FEAR OF HEIGHTS: ICD-10-CM

## 2025-05-08 PROCEDURE — G0463 HOSPITAL OUTPT CLINIC VISIT: HCPCS

## 2025-05-08 RX ORDER — LORAZEPAM 0.5 MG/1
.25-.5 TABLET ORAL EVERY 8 HOURS PRN
Qty: 10 TABLET | Refills: 0 | Status: SHIPPED | OUTPATIENT
Start: 2025-05-08

## 2025-05-08 ASSESSMENT — PAIN SCALES - GENERAL: PAINLEVEL_OUTOF10: NO PAIN (0)

## 2025-05-08 NOTE — PROGRESS NOTES
"  Assessment & Plan     Fear of heights  Fear of flying    Doing well. Has upcoming trip to [place]. Refilled medication to be used while flying. Has upcoming physical appointment in August. Will continue to monitor.   - LORazepam (ATIVAN) 0.5 MG tablet; Take 0.5-1 tablets (0.25-0.5 mg) by mouth every 8 hours as needed for anxiety.              BMI  Estimated body mass index is 32.27 kg/m  as calculated from the following:    Height as of 11/15/24: 1.568 m (5' 1.75\").    Weight as of 11/15/24: 79.4 kg (175 lb).             Wilma Smith is a 64 year old, presenting for the following health issues:    Is traveling to [place] in one month.  Would like to have Ativan to help with traveling and fear of flying, bridges and any areas with cliffs. No significant side effects  Recheck Medication      5/8/2025    10:15 AM   Additional Questions   Roomed by Magno David lpn   Accompanied by self         5/8/2025   Declines Weight   Did patient decline having their weight taken? Yes     History of Present Illness       Reason for visit:  Refill         Medication Followup of Ativan  Taking Medication as prescribed: yes  Side Effects:  None  Medication Helping Symptoms:  helps with anxiety while traveling in [place] or being on a plane    Review of Systems  Constitutional, HEENT, cardiovascular, pulmonary, GI, , musculoskeletal, neuro, skin, endocrine and psych systems are negative, except as otherwise noted.      Objective    /74 (BP Location: Right arm, Patient Position: Sitting, Cuff Size: Adult Regular)   Pulse 76   Temp 97.5  F (36.4  C) (Tympanic)   Resp 16   SpO2 95%   There is no height or weight on file to calculate BMI.  Physical Exam   GENERAL: alert and no distress  EYES: Eyes grossly normal to inspection,   RESP: lungs clear to auscultation - no rales, rhonchi or wheezes  CV: regular rate and rhythm, normal S1 S2, no S3 or S4, no murmur, click or rub, no peripheral edema  ABDOMEN: " soft, nontender,  no masses and bowel sounds normal  MS: no gross musculoskeletal defects noted, no edema  PSYCH: mentation appears normal, affect normal/bright            Signed Electronically by: Odalis Martínez PA-C

## 2025-08-06 ENCOUNTER — TELEPHONE (OUTPATIENT)
Dept: FAMILY MEDICINE | Facility: OTHER | Age: 65
End: 2025-08-06

## 2025-08-07 ENCOUNTER — HOSPITAL ENCOUNTER (EMERGENCY)
Facility: HOSPITAL | Age: 65
Discharge: HOME | End: 2025-08-07
Attending: PHYSICIAN ASSISTANT
Payer: MEDICARE

## 2025-08-07 ENCOUNTER — APPOINTMENT (OUTPATIENT)
Dept: GENERAL RADIOLOGY | Facility: HOSPITAL | Age: 65
End: 2025-08-07
Attending: PHYSICIAN ASSISTANT
Payer: MEDICARE

## 2025-08-07 VITALS
OXYGEN SATURATION: 95 % | WEIGHT: 175 LBS | TEMPERATURE: 98.7 F | SYSTOLIC BLOOD PRESSURE: 137 MMHG | DIASTOLIC BLOOD PRESSURE: 89 MMHG | RESPIRATION RATE: 16 BRPM | HEIGHT: 62 IN | HEART RATE: 85 BPM | BODY MASS INDEX: 32.2 KG/M2

## 2025-08-07 DIAGNOSIS — M79.675 TOE PAIN, LEFT: Primary | ICD-10-CM

## 2025-08-07 PROCEDURE — 73630 X-RAY EXAM OF FOOT: CPT | Mod: LT

## 2025-08-07 PROCEDURE — G0463 HOSPITAL OUTPT CLINIC VISIT: HCPCS | Performed by: PHYSICIAN ASSISTANT

## 2025-08-07 PROCEDURE — 99213 OFFICE O/P EST LOW 20 MIN: CPT | Performed by: PHYSICIAN ASSISTANT

## 2025-08-07 PROCEDURE — 73630 X-RAY EXAM OF FOOT: CPT | Mod: 26 | Performed by: RADIOLOGY

## 2025-08-07 RX ORDER — PREDNISONE 20 MG/1
TABLET ORAL
Qty: 18 TABLET | Refills: 0 | Status: SHIPPED | OUTPATIENT
Start: 2025-08-07

## 2025-08-07 ASSESSMENT — ACTIVITIES OF DAILY LIVING (ADL): ADLS_ACUITY_SCORE: 41

## 2025-08-07 ASSESSMENT — COLUMBIA-SUICIDE SEVERITY RATING SCALE - C-SSRS
2. HAVE YOU ACTUALLY HAD ANY THOUGHTS OF KILLING YOURSELF IN THE PAST MONTH?: NO
1. IN THE PAST MONTH, HAVE YOU WISHED YOU WERE DEAD OR WISHED YOU COULD GO TO SLEEP AND NOT WAKE UP?: NO
6. HAVE YOU EVER DONE ANYTHING, STARTED TO DO ANYTHING, OR PREPARED TO DO ANYTHING TO END YOUR LIFE?: NO

## 2025-08-20 ASSESSMENT — ENCOUNTER SYMPTOMS
MYALGIAS: 1
ARTHRALGIAS: 1

## (undated) DEVICE — TUBING-SUCTION 20FT

## (undated) DEVICE — CONNECTOR-ERBEFLO 2 PORT

## (undated) DEVICE — IRRIGATION-H2O 1000ML

## (undated) RX ORDER — PROPOFOL 10 MG/ML
INJECTION, EMULSION INTRAVENOUS
Status: DISPENSED
Start: 2021-03-18